# Patient Record
Sex: FEMALE | Race: WHITE | Employment: PART TIME | URBAN - METROPOLITAN AREA
[De-identification: names, ages, dates, MRNs, and addresses within clinical notes are randomized per-mention and may not be internally consistent; named-entity substitution may affect disease eponyms.]

---

## 2017-05-22 ENCOUNTER — OFFICE VISIT (OUTPATIENT)
Dept: ORTHOPEDIC SURGERY | Age: 62
End: 2017-05-22
Payer: COMMERCIAL

## 2017-05-22 VITALS
HEART RATE: 79 BPM | HEIGHT: 67 IN | WEIGHT: 187 LBS | BODY MASS INDEX: 29.35 KG/M2 | SYSTOLIC BLOOD PRESSURE: 137 MMHG | DIASTOLIC BLOOD PRESSURE: 65 MMHG

## 2017-05-22 DIAGNOSIS — G56.01 CARPAL TUNNEL SYNDROME, RIGHT: Primary | ICD-10-CM

## 2017-05-22 PROCEDURE — 99213 OFFICE O/P EST LOW 20 MIN: CPT | Performed by: ORTHOPAEDIC SURGERY

## 2017-06-09 ENCOUNTER — HOSPITAL ENCOUNTER (OUTPATIENT)
Dept: WOMENS IMAGING | Age: 62
Discharge: HOME | End: 2017-06-09
Payer: COMMERCIAL

## 2017-06-09 DIAGNOSIS — Z13.9 SCREENING: ICD-10-CM

## 2017-06-09 PROCEDURE — G0202 SCR MAMMO BI INCL CAD: HCPCS

## 2017-09-13 ENCOUNTER — HOSPITAL ENCOUNTER (OUTPATIENT)
Age: 62
Setting detail: SPECIMEN
Discharge: HOME | End: 2017-09-13
Payer: COMMERCIAL

## 2017-09-14 ENCOUNTER — HOSPITAL ENCOUNTER (OUTPATIENT)
Age: 62
Setting detail: SPECIMEN
Discharge: HOME | End: 2017-09-14
Payer: COMMERCIAL

## 2017-09-14 DIAGNOSIS — Z01.419 VISIT FOR GYNECOLOGIC EXAMINATION: ICD-10-CM

## 2017-09-14 DIAGNOSIS — Z11.59 ENCOUNTER FOR HEPATITIS C SCREENING TEST FOR LOW RISK PATIENT: ICD-10-CM

## 2017-09-14 DIAGNOSIS — Z13.220 SCREENING CHOLESTEROL LEVEL: ICD-10-CM

## 2017-09-14 LAB
ALBUMIN SERPL-MCNC: 4.4 G/DL (ref 3.5–5.2)
ALBUMIN/GLOBULIN RATIO: 1.9 (ref 1–2.5)
ALP BLD-CCNC: 63 U/L (ref 35–104)
ALT SERPL-CCNC: 17 U/L (ref 5–33)
ANION GAP SERPL CALCULATED.3IONS-SCNC: 12 MMOL/L (ref 9–17)
AST SERPL-CCNC: 20 U/L
BILIRUB SERPL-MCNC: 0.34 MG/DL (ref 0.3–1.2)
BUN BLDV-MCNC: 17 MG/DL (ref 8–23)
BUN/CREAT BLD: ABNORMAL (ref 9–20)
CALCIUM SERPL-MCNC: 9.3 MG/DL (ref 8.6–10.4)
CHLORIDE BLD-SCNC: 103 MMOL/L (ref 98–107)
CHOLESTEROL/HDL RATIO: 2.2
CHOLESTEROL: 146 MG/DL
CO2: 26 MMOL/L (ref 20–31)
CREAT SERPL-MCNC: 0.47 MG/DL (ref 0.5–0.9)
GFR AFRICAN AMERICAN: >60 ML/MIN
GFR NON-AFRICAN AMERICAN: >60 ML/MIN
GFR SERPL CREATININE-BSD FRML MDRD: ABNORMAL ML/MIN/{1.73_M2}
GFR SERPL CREATININE-BSD FRML MDRD: ABNORMAL ML/MIN/{1.73_M2}
GLUCOSE BLD-MCNC: 95 MG/DL (ref 70–99)
HCT VFR BLD CALC: 35.6 % (ref 36–46)
HDLC SERPL-MCNC: 65 MG/DL
HEMOGLOBIN: 12 G/DL (ref 12–16)
HEPATITIS C ANTIBODY: NONREACTIVE
LDL CHOLESTEROL: 74 MG/DL (ref 0–130)
MCH RBC QN AUTO: 28.2 PG (ref 26–34)
MCHC RBC AUTO-ENTMCNC: 33.7 G/DL (ref 31–37)
MCV RBC AUTO: 83.9 FL (ref 80–100)
PDW BLD-RTO: 14.3 % (ref 12.5–15.4)
PLATELET # BLD: 180 K/UL (ref 140–450)
PMV BLD AUTO: 7.7 FL (ref 6–12)
POTASSIUM SERPL-SCNC: 4.4 MMOL/L (ref 3.7–5.3)
RBC # BLD: 4.24 M/UL (ref 4–5.2)
SODIUM BLD-SCNC: 141 MMOL/L (ref 135–144)
TOTAL PROTEIN: 6.7 G/DL (ref 6.4–8.3)
TRIGL SERPL-MCNC: 37 MG/DL
VLDLC SERPL CALC-MCNC: NORMAL MG/DL (ref 1–30)
WBC # BLD: 3.3 K/UL (ref 3.5–11)

## 2017-09-15 PROBLEM — D72.819 LEUKOPENIA: Status: ACTIVE | Noted: 2017-09-15

## 2017-09-15 LAB
HPV SAMPLE: NORMAL
HPV SOURCE: NORMAL
HPV, GENOTYPE 16: NOT DETECTED
HPV, GENOTYPE 18: NOT DETECTED
HPV, HIGH RISK OTHER: NOT DETECTED
HPV, INTERPRETATION: NORMAL

## 2017-09-19 LAB — CYTOLOGY REPORT: NORMAL

## 2017-12-07 PROBLEM — M53.3 TAIL BONE PAIN: Status: ACTIVE | Noted: 2017-12-07

## 2017-12-07 PROBLEM — H60.8X2 CHRONIC ECZEMATOUS OTITIS EXTERNA OF LEFT EAR: Status: ACTIVE | Noted: 2017-12-07

## 2017-12-07 PROBLEM — G89.29 CHRONIC MIDLINE LOW BACK PAIN WITHOUT SCIATICA: Status: ACTIVE | Noted: 2017-12-07

## 2017-12-07 PROBLEM — M54.50 CHRONIC MIDLINE LOW BACK PAIN WITHOUT SCIATICA: Status: ACTIVE | Noted: 2017-12-07

## 2018-07-13 ENCOUNTER — HOSPITAL ENCOUNTER (OUTPATIENT)
Dept: WOMENS IMAGING | Age: 63
Discharge: HOME | End: 2018-07-15
Payer: COMMERCIAL

## 2018-07-13 DIAGNOSIS — Z12.31 VISIT FOR SCREENING MAMMOGRAM: ICD-10-CM

## 2018-07-13 PROCEDURE — 77063 BREAST TOMOSYNTHESIS BI: CPT

## 2018-07-20 ENCOUNTER — OFFICE VISIT (OUTPATIENT)
Dept: FAMILY MEDICINE CLINIC | Age: 63
End: 2018-07-20
Payer: COMMERCIAL

## 2018-07-20 VITALS
HEART RATE: 68 BPM | WEIGHT: 176 LBS | BODY MASS INDEX: 27.98 KG/M2 | SYSTOLIC BLOOD PRESSURE: 124 MMHG | TEMPERATURE: 97.7 F | RESPIRATION RATE: 16 BRPM | DIASTOLIC BLOOD PRESSURE: 76 MMHG | OXYGEN SATURATION: 98 %

## 2018-07-20 DIAGNOSIS — S63.602A SPRAIN OF LEFT THUMB, UNSPECIFIED SITE OF FINGER, INITIAL ENCOUNTER: ICD-10-CM

## 2018-07-20 DIAGNOSIS — H81.10 BENIGN PAROXYSMAL POSITIONAL VERTIGO, UNSPECIFIED LATERALITY: ICD-10-CM

## 2018-07-20 DIAGNOSIS — R42 DIZZINESS: Primary | ICD-10-CM

## 2018-07-20 DIAGNOSIS — Z13.220 SCREENING CHOLESTEROL LEVEL: ICD-10-CM

## 2018-07-20 DIAGNOSIS — G57.01 PIRIFORMIS SYNDROME, RIGHT: ICD-10-CM

## 2018-07-20 PROBLEM — M53.3 TAIL BONE PAIN: Status: RESOLVED | Noted: 2017-12-07 | Resolved: 2018-07-20

## 2018-07-20 PROCEDURE — 99214 OFFICE O/P EST MOD 30 MIN: CPT | Performed by: FAMILY MEDICINE

## 2018-07-20 ASSESSMENT — ENCOUNTER SYMPTOMS
RHINORRHEA: 0
NAUSEA: 0
BACK PAIN: 0
COUGH: 0
VOMITING: 0
SORE THROAT: 0
CHEST TIGHTNESS: 0
CONSTIPATION: 0
ABDOMINAL DISTENTION: 0
ABDOMINAL PAIN: 0
DIARRHEA: 0
SHORTNESS OF BREATH: 0

## 2018-07-20 NOTE — PROGRESS NOTES
Visit Information    Have you changed or started any medications since your last visit including any over-the-counter medicines, vitamins, or herbal medicines? no   Have you stopped taking any of your medications? Is so, why? -  no  Are you having any side effects from any of your medications? - no    Have you seen any other physician or provider since your last visit?  no   Have you had any other diagnostic tests since your last visit? yes - wrist xray   Have you been seen in the emergency room and/or had an admission in a hospital since we last saw you?  yes - May for wrist injury   Have you had your routine dental cleaning in the past 6 months?  yes - 07/2018     Do you have an active MyChart account? If no, what is the barrier? Yes    Patient Care Team:  Lear Angelucci, MD as PCP - General (Family Medicine)  Caro Adame MD as Consulting Physician (Dermatology)  Milagros Pinto MD as Consulting Physician (Urology)  Berto Ugalde as Consulting Physician (Colon and Rectal Surgery)  Lorenzo Trotter MD as Consulting Physician (Orthopedic Surgery)  Vinayak Suero MD as Consulting Physician (Dermatology)    Medical History Review  Past Medical, Family, and Social History reviewed and does not contribute to the patient presenting condition    Health Maintenance   Topic Date Due    Flu vaccine (1) 09/13/2018 (Originally 9/1/2018)    Shingles Vaccine (1 of 2 - 2 Dose Series) 07/20/2019 (Originally 6/14/2007)    HIV screen  07/19/2021 (Originally 6/14/1970)    DTaP/Tdap/Td vaccine (2 - Td) 11/24/2019    Breast cancer screen  07/13/2020    Cervical cancer screen  09/13/2022    Lipid screen  09/14/2022    Colon cancer screen colonoscopy  11/16/2025    Hepatitis C screen  Completed       Patient/Caregiver verbalize understanding of medications.   Yes

## 2018-07-20 NOTE — PROGRESS NOTES
Subjective:      Patient ID: Tom Francois is a 66 y.o. female. HPI  Pt here today for an acute visit secondary to dizziness/vertigo with head position for about 3-4 weeks. Pt has been remodeling a rental home and doing a lot of painting and projects and looking up and down. She states the dizziness worse with head position and turning her head and when she lays down. She does get a little nauseated at times. She is also c/o left thumb weakness. She did take a fall and went to the Urgent Care and had an x-ray and was negative for a fracture and was put in a brace. She is also c/o right buttock pain for about 6 months. She describes a deep ache in her buttock. No radiation of the pain in to her legs. No numbness or tingling. Pt today denies any HA, chest pain, or SOB. Pt denies any N/V/D/C or abdominal pain. Pt denies any fever or chills. Otherwise pt doing well on current tx and no other concerns today. The patient's past medical, surgical, social, and family history as well as his current medications and allergies were reviewed as documented in today's encounter. No current outpatient prescriptions on file. No current facility-administered medications for this visit. Review of Systems   Constitutional: Negative for appetite change, fatigue and fever. HENT: Negative for congestion, ear pain, rhinorrhea and sore throat. Respiratory: Negative for cough, chest tightness and shortness of breath. Cardiovascular: Negative for chest pain and palpitations. Gastrointestinal: Negative for abdominal distention, abdominal pain, constipation, diarrhea, nausea and vomiting. Genitourinary: Negative for difficulty urinating and dysuria. Musculoskeletal: Negative for arthralgias, back pain and myalgias. Right buttock pain   Skin: Negative for rash. Neurological: Positive for dizziness (and vertigo with head position), weakness (left thumb) and light-headedness.  Negative for - [facility]   5. Screening cholesterol level  Lipid Panel         Plan:      Orders Placed This Encounter   Procedures    CBC     Standing Status:   Future     Standing Expiration Date:   7/20/2019    Comprehensive Metabolic Panel     Standing Status:   Future     Standing Expiration Date:   7/20/2019    T4, Free     Standing Status:   Future     Standing Expiration Date:   7/20/2019    TSH without Reflex     Standing Status:   Future     Standing Expiration Date:   7/20/2019    Vitamin B12     Standing Status:   Future     Standing Expiration Date:   7/20/2019    Vitamin D 25 Hydroxy     Standing Status:   Future     Standing Expiration Date:   7/20/2019    Lipid Panel     Standing Status:   Future     Standing Expiration Date:   7/20/2019     Order Specific Question:   Is Patient Fasting?/# of Hours     Answer:   8-10 Hours   Shai Odonnell 81.     Referral Priority:   Routine     Referral Type:   Eval and Treat     Referral Reason:   Specialty Services Required     Requested Specialty:   Physical Therapy     Number of Visits Requested:   Bobby 32     Referral Priority:   Routine     Referral Type:   Eval and Treat     Referral Reason:   Specialty Services Required     Requested Specialty:   Occupational Therapy     Number of Visits Requested:   1      I do think her dizziness/vertigo is secondary to BPV - I did give her exercises and will get her in to PT    Will start the PT for the right piriformis as well and also gave her exercises    Will start OT for the left thumb weakness    Will get above labs and follow up on results    Rest of systems unchanged, continue current treatments. Medications, labs, diagnostic studies, consultations and follow-up as documented in this encounter.  Rest of systems unchanged, continue current treatments

## 2018-07-20 NOTE — PATIENT INSTRUCTIONS
Patient Education        Epley Maneuver at Home for Vertigo: Exercises  Your Care Instructions  Vertigo is a spinning or whirling sensation when you move your head. Your doctor may have moved you in different positions to help your vertigo get better faster. This is called the Epley maneuver. Your doctor also may have asked you to do these exercises at home. Do the exercises as often as your doctor recommends. If your vertigo is getting worse, your doctor may have you change the exercise or stop it. Step 1  Step 1    1. Sit on the edge of a bed or sofa. Step 2    1. Turn your head 45 degrees in the direction your doctor told you to. This should be toward the ear that causes the most vertigo for you. In this picture, the woman is turning toward her left ear. Step 3    1. Tilt yourself backward until you are lying on your back. Your head should still be at a 45-degree turn. Your head should be about midway between looking straight ahead and looking out to your side. Hold for 30 seconds. If you have vertigo, stay in this position until it stops. Step 4    1. Turn your head 90 degrees toward the ear that has the least vertigo. In this picture, the woman is turning to the right because she has vertigo on her left side. The point of your chin should be raised and over your shoulder. Hold for 30 seconds. Step 5    1. Roll onto the side with the least vertigo. You should now be looking at the floor. Hold for 30 seconds. Follow-up care is a key part of your treatment and safety. Be sure to make and go to all appointments, and call your doctor if you are having problems. It's also a good idea to know your test results and keep a list of the medicines you take. Where can you learn more? Go to https://chpereynaldoeb.Advise Only. org and sign in to your Elevaate account. Enter A679 in the I Read Books box to learn more about \"Epley Maneuver at Home for Vertigo: Exercises. \"     If you do not have an account, please click on the \"Sign Up Now\" link. Current as of: October 9, 2017  Content Version: 11.6  © 1994-0889 [employer]. Care instructions adapted under license by [facility] ([facility]). If you have questions about a medical condition or this instruction, always ask your healthcare professional. [address] any warranty or liability for your use of this information. Patient Education        Epley Maneuver for Vertigo: Exercises  Your Care Instructions  The Epley Maneuver is a series of movements your doctor may use to treat your vertigo. Here are the steps for the exercises. Your doctor or physical therapist will guide you through the movements. A single 10- to 15-minute session often is all that's needed. Crystal debris (canaliths) cause the vertigo. When your head is moved into different positions, the debris moves freely. This may cause your symptoms to stop. How to do the exercises  Step 1    2. You will sit on the doctor's exam table. Your legs will be out in front of you. The doctor or physical therapist will turn your head so that it is senior care between looking straight ahead and looking to the side that causes the worst vertigo. 3. Without changing your head position, he or she will guide you back quickly. Your shoulders will be on the table. Your head will hang over the edge of the table. At this point, the side of your head that is causing the worst vertigo will face the floor. You'll stay in this position for 30 seconds or until your symptoms stop. Step 2    2. Then, the doctor or physical therapist will turn your head to the other side. You don't need to lift your head. The other side of your head will face the floor. You will stay in this position for 30 seconds or until your symptoms stop. Step 3    2. The doctor or physical therapist will help you roll your body in the same direction that your head is facing. You will lie on your side.  (For example, if speaking. ¨ Sudden confusion or trouble understanding simple statements. ¨ Sudden problems with walking or balance. ¨ A sudden, severe headache that is different from past headaches.    Call your doctor now or seek immediate medical care if:    · You feel dizzy and have a fever, headache, or ringing in your ears.     · You have new or increased nausea and vomiting.     · Your dizziness does not go away or comes back.    Watch closely for changes in your health, and be sure to contact your doctor if:    · You do not get better as expected. Where can you learn more? Go to https://Paid To Party LLCpeHurix Systems Privateeweb.Palmaz Scientific. org and sign in to your Kumu Networks account. Enter R039 in the MyStarAutograph box to learn more about \"Dizziness: Care Instructions. \"     If you do not have an account, please click on the \"Sign Up Now\" link. Current as of: November 20, 2017  Content Version: 11.6  © 5636-9169 Versa Networks. Care instructions adapted under license by [facility] ([facility]). If you have questions about a medical condition or this instruction, always ask your healthcare professional. [place] [address] any warranty or liability for your use of this information. Patient Education        Piriformis Syndrome: Care Instructions  Your Care Instructions    The piriformis muscle is deep under your rear end (buttock). One end of the muscle connects deep inside the pelvic area, and the other end attaches to the top of the thighbone. This muscle can press on the sciatic nerve that runs from your spine down your leg. When this happens, you may have pain, numbness, and tingling in the buttock and down the back of your leg. This is called piriformis syndrome. The pain may get worse when you sit for a long time or climb stairs. Also, you may be more likely to develop piriformis syndrome if you run or walk often. Your doctor will check for other causes of your pain before treating this syndrome.  Treatment may medicines. Read and follow all instructions on the label. · Have your doctor or a physical therapist watch how you move. You may need physical therapy or special inserts in your shoes (orthotics) to help you move in a way that does not put pressure on your nerves. When should you call for help? Watch closely for changes in your health, and be sure to contact your doctor if:    · You do not feel better after several weeks of home care.     · Your pain gets worse.     · Your leg becomes weak or numb. Where can you learn more? Go to https://SplashMapspeBathurst Resources Limited.Zumper. org and sign in to your LOYAL3 account. Enter X267 in the Woto box to learn more about \"Piriformis Syndrome: Care Instructions. \"     If you do not have an account, please click on the \"Sign Up Now\" link. Current as of: November 29, 2017  Content Version: 11.6  © 9970-8008 Pronota. Care instructions adapted under license by [facility] ([facility]). If you have questions about a medical condition or this instruction, always ask your healthcare professional. [address] any warranty or liability for your use of this information. Patient Education        Piriformis Syndrome: Exercises  Your Care Instructions  Here are some examples of typical rehabilitation exercises for your condition. Start each exercise slowly. Ease off the exercise if you start to have pain. Your doctor or physical therapist will tell you when you can start these exercises and which ones will work best for you. How to do the exercises  Hip rotator stretch    4. Lie on your back with both knees bent and your feet flat on the floor. 5. Put the ankle of your affected leg on your opposite thigh near your knee. 6. Use your hand to gently push your knee (on your affected leg) away from your body until you feel a gentle stretch around your hip. 7. Hold the stretch for 15 to 30 seconds. 8. Repeat 2 to 4 times.   9. Switch legs Exercises  Your Care Instructions  Here are some examples of typical rehabilitation exercises for your condition. Start each exercise slowly. Ease off the exercise if you start to have pain. Your doctor or physical therapist will tell you when you can start these exercises and which ones will work best for you. How to do the exercises  Exercise 1    10. Stand with a chair in front of you and a wall behind you. If you begin to fall, you may use them for support. 11. Stand with your feet together and your arms at your sides. 12. Move your head up and down 10 times. Exercise 2    8. Move your head side to side 10 times. Exercise 3    10. Move your head diagonally up and down 10 times. Exercise 4    3. Move your head diagonally up and down 10 times on the other side. Follow-up care is a key part of your treatment and safety. Be sure to make and go to all appointments, and call your doctor if you are having problems. It's also a good idea to know your test results and keep a list of the medicines you take. Where can you learn more? Go to https://The Business of FashionpeAssayMetrics.ClearRisk. org and sign in to your TV Talk Network account. Enter F349 in the ePartners box to learn more about \"Vertigo: Exercises. \"     If you do not have an account, please click on the \"Sign Up Now\" link. Current as of: May 4, 2017  Content Version: 11.6  © 7982-8296 TreatFeed, Incorporated. Care instructions adapted under license by [facility] ([facility]). If you have questions about a medical condition or this instruction, always ask your healthcare professional. [place] [address] any warranty or liability for your use of this information. Patient Education        Cawthorne Exercises for Vertigo: Care Instructions  Your Care Instructions  Simple exercises can help you regain your balance when you have vertigo.  If you have Ménière's disease, benign paroxysmal positional vertigo (BPPV), or another inner ear problem, you may cleared of obstacles:  · Walk to a corner of the room, turn to your right, and walk back to the starting point. Now, repeat and turn left. · Walk up and down a slope. Now try stairs. · While holding on to someone's arm, try these exercises with your eyes closed. When should you call for help? Watch closely for changes in your health, and be sure to contact your doctor if:    · You do not get better as expected. Where can you learn more? Go to https://KIS Grouppereynaldoeb.U.S. Local News Network. org and sign in to your FitWithMe account. Enter P385 in the Snacksquare box to learn more about \"Cawthorne Exercises for Vertigo: Care Instructions. \"     If you do not have an account, please click on the \"Sign Up Now\" link. Current as of: May 12, 2017  Content Version: 11.6  © 6637-6048 TravelZeeky. Care instructions adapted under license by [facility] ([facility]). If you have questions about a medical condition or this instruction, always ask your healthcare professional. [place] [address] any warranty or liability for your use of this information. Patient Education        Benign Paroxysmal Positional Vertigo (BPPV): Care Instructions  Your Care Instructions    Benign paroxysmal positional vertigo, also called BPPV, is an inner ear problem. It causes a spinning or whirling sensation when you move your head. This sensation is called vertigo. The vertigo usually lasts for less than a minute. People often have vertigo spells for a few days or weeks. Then the vertigo goes away. But it may come back again. The vertigo may be mild, or it may be bad enough to cause unsteadiness, nausea, and vomiting. When you move, your inner ear sends messages to the brain. This helps you keep your balance. Vertigo can happen when debris builds up in the inner ear. The buildup can cause the inner ear to send the wrong message to the brain.   Your doctor may move you in different positions to help your vertigo get

## 2018-07-25 DIAGNOSIS — Z13.220 SCREENING CHOLESTEROL LEVEL: ICD-10-CM

## 2018-07-25 DIAGNOSIS — R42 DIZZINESS: ICD-10-CM

## 2018-07-25 LAB
ALBUMIN SERPL-MCNC: 4.2 G/DL
ALP BLD-CCNC: 59 U/L
ALT SERPL-CCNC: 15 U/L
ANION GAP SERPL CALCULATED.3IONS-SCNC: NORMAL MMOL/L
AST SERPL-CCNC: 18 U/L
BASOPHILS ABSOLUTE: NORMAL /ΜL
BASOPHILS RELATIVE PERCENT: NORMAL %
BILIRUB SERPL-MCNC: 0.5 MG/DL (ref 0.1–1.4)
BUN BLDV-MCNC: 15 MG/DL
CALCIUM SERPL-MCNC: 9.2 MG/DL
CHLORIDE BLD-SCNC: 103 MMOL/L
CHOLESTEROL, TOTAL: 161 MG/DL
CHOLESTEROL/HDL RATIO: 2.6
CO2: NORMAL MMOL/L
CREAT SERPL-MCNC: 0.73 MG/DL
EOSINOPHILS ABSOLUTE: NORMAL /ΜL
EOSINOPHILS RELATIVE PERCENT: NORMAL %
GFR CALCULATED: NORMAL
GLUCOSE BLD-MCNC: 101 MG/DL
HCT VFR BLD CALC: 36.3 % (ref 36–46)
HDLC SERPL-MCNC: 62 MG/DL (ref 35–70)
HEMOGLOBIN: 12.5 G/DL (ref 12–16)
LDL CHOLESTEROL CALCULATED: 88 MG/DL (ref 0–160)
LYMPHOCYTES ABSOLUTE: NORMAL /ΜL
LYMPHOCYTES RELATIVE PERCENT: NORMAL %
MCH RBC QN AUTO: NORMAL PG
MCHC RBC AUTO-ENTMCNC: NORMAL G/DL
MCV RBC AUTO: NORMAL FL
MONOCYTES ABSOLUTE: NORMAL /ΜL
MONOCYTES RELATIVE PERCENT: NORMAL %
NEUTROPHILS ABSOLUTE: NORMAL /ΜL
NEUTROPHILS RELATIVE PERCENT: NORMAL %
PLATELET # BLD: 157 K/ΜL
PMV BLD AUTO: NORMAL FL
POTASSIUM SERPL-SCNC: 3.7 MMOL/L
RBC # BLD: NORMAL 10^6/ΜL
SODIUM BLD-SCNC: 139 MMOL/L
T4 FREE: 0.85
TOTAL PROTEIN: 6.5
TRIGL SERPL-MCNC: 53 MG/DL
TSH SERPL DL<=0.05 MIU/L-ACNC: 2.05 UIU/ML
VITAMIN B-12: 252
VITAMIN D 25-HYDROXY: 37.6
VITAMIN D2, 25 HYDROXY: NORMAL
VITAMIN D3,25 HYDROXY: NORMAL
VLDLC SERPL CALC-MCNC: NORMAL MG/DL
WBC # BLD: 3 10^3/ML

## 2018-07-26 ENCOUNTER — HOSPITAL ENCOUNTER (OUTPATIENT)
Dept: OCCUPATIONAL THERAPY | Age: 63
Setting detail: THERAPIES SERIES
Discharge: HOME | End: 2018-07-26
Payer: COMMERCIAL

## 2018-07-26 PROCEDURE — 97110 THERAPEUTIC EXERCISES: CPT

## 2018-07-26 PROCEDURE — 97165 OT EVAL LOW COMPLEX 30 MIN: CPT

## 2018-07-26 ASSESSMENT — 9 HOLE PEG TEST
TESTTIME_SECONDS: 28
TEST_RESULT: FUNCTIONAL
TESTTIME_SECONDS: 20
TEST_RESULT: IMPAIRED

## 2018-07-26 NOTE — PROGRESS NOTES
percentile for norms)  Tip: 7,8 average 7.5# (25th percentile for norms)  Palmar 3 point: 11,11 (between 50th & 70th percentile for norms)  Right Hand Strength -  (lbs)  Handle Setting 2: 47,50 average 48.5# (between the 25th and 50th percentile for norms)  Right Hand Strength - Pinch (lbs)  Lateral: 21,20  Tip: 14,14  Palmar 3 point: 15,16  Fine Motor Skills  Left 9-Hole Peg Test: Impaired (Pt is between 10th and 25th percentile for norms)  Left 9 Hole Peg Test Time (secs): 28  Right 9-Hole Peg Test: Functional  Right 9 Hole Peg Test Time (secs): 20  Other exercises  Other exercises?: Yes  Other exercises 1: HEP lt thumb IP and MP blocking exercises, handouts w lt hand 6 PACK AROM exercises, towel crunch for flexion, & lt wrist Arom flexion/extension, lt thumb (adduction, reach to base small finger, palmar & radial abduction, thumb circles cw/ccw, opposition to index & little fingers. Other exercises 2: Massage and PROM lt wrist/thumb  Assessment  Performance deficits / Impairments: Decreased ROM, Decreased fine motor control, Decreased strength  Assessment: Pt would benefit from therapy to improve functional rom, coordination, strength lt hand/thumb/wrist to make fine motor tasks easier. Treatment Diagnosis: Lt hand/wrist weakness, decrease motion/stiffness lt thumb/wrist, impaired lt hand coordination  Prognosis: Good  Decision Making: Low Complexity  History:  Pt injured lt thumb after a fall in the yard. Arthritis. Pt states difficulty making fist/closing her hands in the a.m. Exam: 3 performance deficits. Assessed ROM, strength, coordination - 9 hole peg test, dynamometer for , pinch meter for pinch strength. Assistance / Modification: Pt can't fully extend lt thumb & has rom limitations for flexion, so pt uses rt had to position lt thumb. Pt unable to lift lt thumb off table when palm is resting on table. Patient Education: OT POC. OT instructed pt in HEP for lt thumb/ hand/wrist w handouts. See OT exercises for details. Pt correctly demonstratesHEP  exercises. Copy of handouts in chart. OT told pt to use her thumb spica brace at night to for rest and positioning lt thumb. Barriers to Learning: none  REQUIRES OT FOLLOW UP: Yes  Treatment Initiated : PROM/massage lt thumb Paddy Gravely. HEP instruction lt wrist/hand/thumb. Discharge Recommendations: Outpatient OT   Goals  Patient Goals   Patient goals : To get thumb back, get movement and strength back. Short term goals  Time Frame for Short term goals: 4 weeks   Short term goal 1: Pt will demonstrate and verbalize independence w HEP for lt thumb,hand, wrist   Short term goal 2: Pt will demonstrate improved lt hand fine motor coordination w computer typing, hooking bra, & complete 9 hole peg test using lt hand 5 seconds faster than eval.  Short term goal 3: Increase lt hand  strength by 10th to improve grasp w daily activities  Short term goal 4: Increase lt hand tip pinch strength by 2#   Short term goal 5: increase  AROM lt wrist (flexion,extension, UD) by 10. Long term goals  Time Frame for Long term goals : 6 weeks  Long term goal 1: Increase lt wrist  and hand flexor strength to 4/5 and increase lt thumb flexor & extensor strength to 4/5 to improve grasp/release strength for daily activities  Long term goal 2: Increase AROM lt thumb: IP  flexion & extension by 10, MP flexion by 10 & extension by 15. Long term goal 3:  Increase lt thumb: palmar abduction by 15 and radial abduction by 5  Long term goal 4: Pt will be able to  lift lt thumb off table (when palm is resting on table) and hold for 10 seconds  Plan  REQUIRES OT FOLLOW UP: Yes  Plan  Times per week: 2  Plan weeks: 5-6 weeks  Current Treatment Recommendations: ROM, Strengthening, Patient/Caregiver Education & Training (coordination)  Plan Comment: Send OT eval. to MD. Continue OT  OT Individual Minutes  Time In: 5405  Time Out: 1510  Minutes: 50  Time Code Minutes   Timed Code Treatment

## 2018-07-30 ENCOUNTER — HOSPITAL ENCOUNTER (OUTPATIENT)
Dept: OCCUPATIONAL THERAPY | Age: 63
Setting detail: THERAPIES SERIES
Discharge: HOME | End: 2018-07-30
Payer: COMMERCIAL

## 2018-07-30 PROCEDURE — 97110 THERAPEUTIC EXERCISES: CPT

## 2018-07-30 NOTE — PROGRESS NOTES
stretch in lt hand/wrist w theraband flex bar and key peg exxercises. Pt beginning to extend lt thumb IP joint more, but still unable to raise lt thumb off table w palm flat. Treatment Diagnosis: Lt hand/wrist weakness, decrease motion/stiffness lt thumb/wrist, impaired lt hand coordination  Prognosis: Good  Patient Education: HEP -hands in prayer position then adduct/abduct thumbs 20x. Lt thumb hold edge of table 10 seconds 2 sets 10x w wrist in neutral position & fingers under table flexed. Pt hold marker then flex/extend thumb IP.   Pt correctly demonstrate these exercises  Barriers to Learning: None   REQUIRES OT FOLLOW UP: Yes  Discharge Recommendations: Outpatient OT           Plan  REQUIRES OT FOLLOW UP: Yes  Plan  Times per week: 2  Plan weeks: 5-6 weeks  Current Treatment Recommendations: ROM, Strengthening, Patient/Caregiver Education & Training (coordination)  Plan Comment:  Continue OT  OT Individual Minutes  Time In: 3233  Time Out: 9843  Minutes: 52  Time Code Minutes   Timed Code Treatment Minutes: 46 Minutes    Electronically signed by Miriam Kim OT on 7/30/18 at 7:37 PM          Treatment Charges:  Minutes Units   Ultrasound 5 0   Electrical Stim     Iontophoresis     Paraffin      Massage     Eval     ADL      Ther Exercise 47 3   Ther Activities     Neuro Re-Ed     Splinting      Other     Total Treatment Time:  52

## 2018-08-01 ENCOUNTER — HOSPITAL ENCOUNTER (OUTPATIENT)
Dept: OCCUPATIONAL THERAPY | Age: 63
Setting detail: THERAPIES SERIES
Discharge: HOME | End: 2018-08-01
Payer: COMMERCIAL

## 2018-08-01 PROCEDURE — 97110 THERAPEUTIC EXERCISES: CPT

## 2018-08-01 NOTE — PROGRESS NOTES
Occupational [address] [facility]   Rehabilitation Services  Occupational Therapy Treatment Note  Date: 18  Patient Name: Niall Carrillo    MRN: [id number]  Account: [de-identified]   : 1955  (66 y.o.) Gender: female     General  Referring Practitioner: Dr Mare Saenz. Usha  Diagnosis: sprain of lt thumb S63.602A ICD-10 CM   OT Visit Information  OT Insurance Information: [employer]  Total # of Visits Approved: 12  Total # of Visits to Date: 3  Progress Note Counter: MD appt 2018  Subjective  Subjective: Pt reports doing exercises lt thumb. Pt states that she has no pain. Pain Assessment  Patient Currently in Pain: No           Other exercises  Other exercises?: Yes  Other exercises 1: HEP -peach theraputty w handout -lt hand strengthening ( gripping, oppositional grimes pinch, finger adductors, lateral pinch, 3jaw mirtha pinch and pull, thumb and finger extensor and abductor strengthening) 15-20 reps   Other exercises 2: Massage and PROM lt wrist/thumb  Other exercises 3: Ultrasound 1.0 w/cm 2 lt  radial side wrist/thumb  Other exercises 4: red/beige pegs (1/2\" diameter cylindrical pegs) - 47 pegs using lt hand flip over & place, then place pegs  Other exercises 5: juxaciser over and back 3 sets w lt hand  Other exercises 6: yellow 1.5# digiflex - lt hand gripping 20x, each finger and thumb pinching 20x  Other exercises 7: key peg place/remove all 25 pegs using lt hand  Other exercises 9: yellow theraband 6# flexbar - bilateral UE 20x (make U, make upside down U, twist)  Other exercises 10: graded clothespins: lt hand place/remove yellow min resistance, red medium resistance, green moderate resistance  Assessment  Performance deficits / Impairments: Decreased ROM, Decreased fine motor control, Decreased strength  Assessment: OT progressed pt graded clothespin exercise to improve lt hand prehension strength. Ultrasound to decrease tightness lt thumb.  Tx focus on lt hand/thumb Kiana Lester  P/AROM

## 2018-08-08 ENCOUNTER — HOSPITAL ENCOUNTER (OUTPATIENT)
Dept: OCCUPATIONAL THERAPY | Age: 63
Setting detail: THERAPIES SERIES
Discharge: HOME | End: 2018-08-08
Payer: COMMERCIAL

## 2018-08-08 PROCEDURE — 97110 THERAPEUTIC EXERCISES: CPT

## 2018-08-08 NOTE — PROGRESS NOTES
exercises 11: BTE tool 601 T = 8 inlb lt supinate/pronate 120 seconds  Other exercises 12: BTE tool 302 T = 8 inlb lt wrist RD/UD 90 seconds  Other exercises 13: BTE tool 162 T = 50 inlb lt hand gripping 90 seconds  Assessment  Performance deficits / Impairments: Decreased ROM, Decreased fine motor control, Decreased strength  Assessment: Pt demonstrates improved lt thumb and wrist AROM. Begun lt hand wrist strengthening on BTE w tools 162 to increase  strength, 302 to simulate open/close jars, and 601 hand grasp w forearm supination /pronation. Pt completes upgraded digiflex for lt hand /finger strengthening w no reports of pain. Pt reports using lt hand more w daily activities (ie opening toothpaste tube, picking up small items etc.)See OT exercises for improving lt hand/wrist strength and rom.    Treatment Diagnosis: Lt hand/wrist weakness, decrease motion/stiffness lt thumb/wrist, impaired lt hand coordination  Prognosis: Good  REQUIRES OT FOLLOW UP: Yes  Discharge Recommendations: Outpatient OT     Plan  REQUIRES OT FOLLOW UP: Yes  Plan  Times per week: 2  Plan weeks: 5-6 weeks  Current Treatment Recommendations: ROM, Strengthening, Patient/Caregiver Education & Training (coordination)  Plan Comment:  Continue OT  OT Individual Minutes  Time In: 1300  Time Out: 5346  Minutes: 45  Time Code Minutes   Timed Code Treatment Minutes: 45 Minutes    Electronically signed by Jackelin Aguilar OT on 8/8/18 at 6:57 PM      Treatment Charges:  Minutes Units   Ultrasound 5 0   Electrical Stim     Iontophoresis     Paraffin      Massage     Eval     ADL      Ther Exercise 40 3   Ther Activities     Neuro Re-Ed     Splinting      Other     Total Treatment Time:  45

## 2018-08-10 ENCOUNTER — HOSPITAL ENCOUNTER (OUTPATIENT)
Dept: OCCUPATIONAL THERAPY | Age: 63
Setting detail: THERAPIES SERIES
Discharge: HOME | End: 2018-08-10
Payer: COMMERCIAL

## 2018-08-10 PROCEDURE — 97110 THERAPEUTIC EXERCISES: CPT

## 2018-08-10 NOTE — PROGRESS NOTES
Occupational [address] [facility]   Rehabilitation Services  Occupational Therapy Treatment Note  Date: 8/10/18  Patient Name: Makenna Agustin    MRN: [id number]  Account: [de-identified]   : 1955  (66 y.o.) Gender: female     General  Referring Practitioner: Dr Melanie Kerr  Diagnosis: sprain of lt thumb S63.602A ICD-10 CM   OT Visit Information  OT Insurance Information: [employer]  Total # of Visits Approved: 12  Total # of Visits to Date: 5  Progress Note Counter: MD appt 2018  Subjective  Subjective: Pt states her daughter was in town. Pt states her lt thumb is doing /moving much better. Pt states lt thumb is more flexible and she is doing all her exercises. Pt states she still can't raise her lt thumb off table w palm flat. Comments: OT called pt and reminded her of appt. Pt states she forgot. Pt came later in afternoon for OT.    Pain Assessment  Patient Currently in Pain: Denies           Other exercises  Other exercises 2: Massage and PROM lt wrist/thumb  Other exercises 3: Ultrasound 1.0 w/cm 2 lt  radial side wrist/thumb  Other exercises 6: red 3# digiflex - lt hand gripping 20x, each finger and thumb pinching 20x  Other exercises 9: yellow theraband 6# flexbar - bilateral UE 20x (make U, make upside down U, twist, make C,make backward C)  Other exercises 10: graded clothespins: lt hand place/remove yellow min resistance, red medium resistance, green moderate resistance, blue heavy resistance  Other exercises 11: BTE tool 601 T = 8 inlb lt supinate/pronate 120 seconds  Other exercises 12: BTE tool 302 T = 8 inlb lt wrist RD/ seconds  Other exercises 13: BTE tool 162 T = 50 inlb lt hand gripping 120 seconds  Other exercises 14:  use lt hand press cone right side up into putty 20x and press cone upside down into putty 20x  Assessment  Performance deficits / Impairments: Decreased ROM, Decreased fine motor control, Decreased strength  Assessment: OT tx focus on ultrasound to decrease lt thumb tightness so that pt can flex/extend lt thumb at MP and IP more. Tx includes P/AROM lt hand /thumb, and lt wrist/hand strengthening exercises. Begun cone in putty exercise for lt hand /wrist strengthening. See OT exercises for details. Treatment Diagnosis: Lt hand/wrist weakness, decrease motion/stiffness lt thumb/wrist, impaired lt hand coordination  Prognosis: Good  Patient Education: HEP lt thumb tapping on table w palm slightly raised off table. Pt able to do this exercise. With palm flat pt able to move lt thumb minimally off table but it is shaky.    Barriers to Learning: None  REQUIRES OT FOLLOW UP: Yes  Discharge Recommendations: Outpatient OT           Plan  REQUIRES OT FOLLOW UP: Yes  Plan  Times per week: 2  Plan weeks: 5-6 weeks  Specific instructions for Next Treatment: Do progress update for MD next week  Current Treatment Recommendations: ROM, Strengthening, Patient/Caregiver Education & Training (coordination)  Plan Comment:  Continue OT  OT Individual Minutes  Time In: 2642  Time Out: 0730  Minutes: 50  Time Code Minutes   Timed Code Treatment Minutes: 50 Minutes    Electronically signed by Olivier Quinonez OT on 8/10/18 at 3:46 PM          Treatment Charges:  Minutes Units   Ultrasound 5 0   Electrical Stim     Iontophoresis     Paraffin      Massage     Eval     ADL      Ther Exercise 45 3   Ther Activities     Neuro Re-Ed     Splinting      Other     Total Treatment Time:  50

## 2018-08-15 ENCOUNTER — HOSPITAL ENCOUNTER (OUTPATIENT)
Dept: OCCUPATIONAL THERAPY | Age: 63
Setting detail: THERAPIES SERIES
Discharge: HOME | End: 2018-08-15
Payer: COMMERCIAL

## 2018-08-15 PROCEDURE — 97110 THERAPEUTIC EXERCISES: CPT

## 2018-08-15 ASSESSMENT — 9 HOLE PEG TEST
TESTTIME_SECONDS: 20
TEST_RESULT: FUNCTIONAL

## 2018-08-15 NOTE — PROGRESS NOTES
Occup[facility]  Re[facility] Services          Occupational Therapy Progress Update Note  Date: 8/15/18  Patient Name: Julio Trejo      MRN: [id number]  Account: [de-identified]   : 1955  (66 y.o.)  Gender: female   Referring Practitioner: Dr Michael Perla. Usha  Diagnosis: sprain of lt thumb S63.602A ICD-10 CM   OT Visit Information  OT Insurance Information: [employer]  Total # of Visits Approved: 12  Total # of Visits to Date: 6  Progress Note Counter: MD appt 2018  Pain Assessment  Patient Currently in Pain: Denies  Subjective  Subjective: Pt states no pain, just some tightness that stretches out on top of lt hand. Pt states able to put lt hand behind back and scratch w thumb. Pt states using button on cell phone w lt thumb is getting better. Objective  UE Function  LUE Strength  L Wrist Flex: 4+/5 (increase)  L Wrist Ext: 4+/5 (increase)  L Wrist Radial Deviation: 4+/5 (increase)  L Wrist Ulnar Deviation: 4+/5 (increase)  L Hand Grasp: 4/5 (increase)  L Hand Release: 4-/5 (decrease)  LUE AROM (degrees)  L Wrist Flexion 0-80: 65 (increase)  L Wrist Extension 0-70: 60 (increase w soreness)  L Wrist Radial Deviation 0-20: 20 (increase )  L Wrist Ulnar Deviation 0-45: 35 (increase)  Left Hand AROM (degrees)  Left Hand General AROM: Lt index, long, ring, little fingers - wfls  L Thumb MCP 0-50: 50  flexion(increase), 15 extension lag  L Thumb IP 0-80: 50 flexion, 10 extension lag  L Thumb Radial ADduction/ABduction 0-55: 45  L Thumb Palmar ADduction/ABduction 0-45: 45  L Thumb Opposition: wfls  Left Hand Strength -  (lbs)  Handle Setting 2: 37,43 average 40# (increase by 5)  Left Hand Strength - Pinch (lbs)  Tip: 8, 8 (increase by . 5)  Fine Motor Skills  Left 9-Hole Peg Test: Functional (improved speed by 8 seconds)  Left 9 Hole Peg Test Time (secs): 20  Other exercises  Other exercises 1: HEP isometric flexion thumbs hold 10 seconds 10x  Other exercises 2: Massage and Pt reports improved typing . Short term goal 3: Increase lt hand  strength by 10# to improve grasp w daily activities. Pt making progress - goal ongoing. Short term goal 4: Increase lt hand tip pinch strength by 2# . Pt making progress - goal ongoing. Short term goal 5: increase  AROM lt wrist (flexion,extension, UD) by 10. Goal met for all lt wrist motions. Long term goals  Long term goal 1: Increase lt wrist  and hand flexor strength to 4/5 and increase lt thumb flexor & extensor strength to 4/5 to improve grasp/release strength for daily activities. Goal met for lt wrist strength. Goal met for lt hand flexors and ongoing for lt hand extensors. Goal met for lt thumb flexors & goal ongoing for lt thumb extensors. Long term goal 2: Increase AROM lt thumb: IP  flexion & extension by 10, MP flexion by 10 & extension by 15. Goal met MP flexion . Pt making progress w MP extension -goal ongoing. Goal ongoing thumb IP AROM . Long term goal 3: Increase lt thumb: palmar abduction by 15 and radial abduction by 5. Goal met palmar abduction. Goal ongoing radial abduction lt thumb. Long term goal 4: Pt will be able to  lift lt thumb off table (when palm is resting on table) and hold for 10 seconds. Pt has made a little progress but goal is ongoing. Plan  REQUIRES OT FOLLOW UP: Yes  Plan  Times per week: 2  Plan weeks: 5-6 weeks  Current Treatment Recommendations: ROM, Strengthening, Patient/Caregiver Education & Training (coordination)  Plan Comment: Send update note to MD. Continue OT.    OT Individual Minutes  Time In: 3259  Time Out: 2461  Minutes: 50  Time Code Minutes   Timed Code Treatment Minutes: 35 Minutes  Rehab Potential:  [x] Good  [] Fair  [] Poor   Suggested Professional Referral:  [x] No  [] Yes:  Barriers to Goal Achievement:  [x] No  [] Yes: Domestic Concerns:  [] No  [] Yes:  Treatment Plan:  [x] Therapeutic Exercise      [x] Ultrasound   [x] Instruction in HEP       Frequency:   Continue  2

## 2018-08-17 ENCOUNTER — OFFICE VISIT (OUTPATIENT)
Dept: FAMILY MEDICINE CLINIC | Age: 63
End: 2018-08-17
Payer: COMMERCIAL

## 2018-08-17 ENCOUNTER — HOSPITAL ENCOUNTER (OUTPATIENT)
Dept: OCCUPATIONAL THERAPY | Age: 63
Setting detail: THERAPIES SERIES
Discharge: HOME | End: 2018-08-17
Payer: COMMERCIAL

## 2018-08-17 ENCOUNTER — HOSPITAL ENCOUNTER (OUTPATIENT)
Dept: GENERAL RADIOLOGY | Facility: CLINIC | Age: 63
Discharge: HOME | End: 2018-08-19
Payer: COMMERCIAL

## 2018-08-17 ENCOUNTER — HOSPITAL ENCOUNTER (OUTPATIENT)
Facility: CLINIC | Age: 63
Discharge: HOME | End: 2018-08-19
Payer: COMMERCIAL

## 2018-08-17 VITALS
DIASTOLIC BLOOD PRESSURE: 80 MMHG | TEMPERATURE: 97.8 F | WEIGHT: 180 LBS | SYSTOLIC BLOOD PRESSURE: 146 MMHG | RESPIRATION RATE: 16 BRPM | HEART RATE: 64 BPM | OXYGEN SATURATION: 98 % | BODY MASS INDEX: 28.62 KG/M2

## 2018-08-17 DIAGNOSIS — S63.602D SPRAIN OF LEFT THUMB, UNSPECIFIED SITE OF FINGER, SUBSEQUENT ENCOUNTER: ICD-10-CM

## 2018-08-17 DIAGNOSIS — H81.10 BENIGN PAROXYSMAL POSITIONAL VERTIGO, UNSPECIFIED LATERALITY: Primary | ICD-10-CM

## 2018-08-17 DIAGNOSIS — D49.2 LEFT FOOT SOFT TISSUE TUMOR: ICD-10-CM

## 2018-08-17 DIAGNOSIS — R73.9 HYPERGLYCEMIA: ICD-10-CM

## 2018-08-17 DIAGNOSIS — E53.8 B12 DEFICIENCY: ICD-10-CM

## 2018-08-17 DIAGNOSIS — G57.01 PIRIFORMIS SYNDROME, RIGHT: ICD-10-CM

## 2018-08-17 DIAGNOSIS — D72.819 LEUKOPENIA, UNSPECIFIED TYPE: ICD-10-CM

## 2018-08-17 LAB — HBA1C MFR BLD: 5.6 %

## 2018-08-17 PROCEDURE — 73630 X-RAY EXAM OF FOOT: CPT

## 2018-08-17 PROCEDURE — 83036 HEMOGLOBIN GLYCOSYLATED A1C: CPT | Performed by: FAMILY MEDICINE

## 2018-08-17 PROCEDURE — 97110 THERAPEUTIC EXERCISES: CPT

## 2018-08-17 PROCEDURE — 73130 X-RAY EXAM OF HAND: CPT

## 2018-08-17 PROCEDURE — 99214 OFFICE O/P EST MOD 30 MIN: CPT | Performed by: FAMILY MEDICINE

## 2018-08-17 ASSESSMENT — ENCOUNTER SYMPTOMS
DIARRHEA: 0
CHEST TIGHTNESS: 0
NAUSEA: 0
SHORTNESS OF BREATH: 0
SORE THROAT: 0
COUGH: 0
RHINORRHEA: 0
CONSTIPATION: 0
VOMITING: 0
BACK PAIN: 0
ABDOMINAL PAIN: 0
ABDOMINAL DISTENTION: 0

## 2018-08-17 NOTE — PROGRESS NOTES
Occupational [address] [facility]   Rehabilitation Services  Occupational Therapy Treatment Note  Date: 18  Patient Name: Alexis Olivo    MRN: [id number]  Account: [de-identified]   : 1955  (66 y.o.) Gender: female     General  Referring Practitioner: Dr Billie Crowe. Usha  Diagnosis: sprain of lt thumb S63.602A ICD-10 CM   OT Visit Information  OT Insurance Information: [employer]  Total # of Visits Approved: 12  Total # of Visits to Date: 7  Subjective  Subjective: Pt saw MD today. Pt states she will get xray on her lt hand after therapy. Pt states MD wants her to see Dr Yue Griffith. Lanie Hylton for her hand  Pain Assessment  Patient Currently in Pain: Denies           Other exercises  Other exercises 2: Massage and PROM lt wrist/thumb  Other exercises 3: Ultrasound 1.0 w/cm 2 lt  radial side wrist/thumb  Other exercises 5: juxaciser over and back 3 sets w lt hand  Other exercises 6: red 3# digiflex - lt hand gripping 25x, each finger and thumb pinching 25x  Other exercises 7: key peg place/remove all 25 pegs using lt hand  Other exercises 9: red theraband 10# flexbar - bilateral UE 20x (make U, make upside down U, twist, make C,make backward C)  Other exercises 10: graded clothespins: lt hand place/remove yellow min resistance, red medium resistance, green moderate resistance, blue heavy resistance, black very heavy resist.   Other exercises 11: BTE tool 601 T = 10 inlb lt supinate/pronate 120 seconds  Other exercises 12: BTE tool 302 T = 8 inlb lt wrist RD/ seconds  Other exercises 13: BTE tool 162 T = 55  inlb lt hand gripping 120 seconds  Other exercises 14:  use lt hand press cone right side up into putty 20x and press cone upside down into putty 20x  Assessment  Performance deficits / Impairments: Decreased ROM, Decreased fine motor control, Decreased strength  Assessment: See OT exercises for lt UE(hand/wrist ) therapeutic exercises to improve lt thumb rom and lt hand strength.   Treatment Diagnosis: Lt hand/wrist weakness, decrease motion/stiffness lt thumb/wrist, impaired lt hand coordination  Prognosis: Good  REQUIRES OT FOLLOW UP: Yes  Discharge Recommendations: Outpatient OT           Plan  REQUIRES OT FOLLOW UP: Yes  Plan  Times per week: 2  Plan weeks: 5-6 weeks  Current Treatment Recommendations: ROM, Strengthening, Patient/Caregiver Education & Training (coordination)  Plan Comment: continue OT  OT Individual Minutes  Time In: 5871  Time Out: 1350  Minutes: 52  Time Code Minutes   Timed Code Treatment Minutes: 52 Minutes    Electronically signed by Suhail Devi OT on 8/17/18 at 4:57 PM          Treatment Charges:  Minutes Units   Ultrasound 5 0   Electrical Stim     Iontophoresis     Paraffin      Massage     Eval     ADL      Ther Exercise 47 3   Ther Activities     Neuro Re-Ed     Splinting      Other     Total Treatment Time:  52

## 2018-08-17 NOTE — PROGRESS NOTES
HAND LEFT (MIN 3 VIEWS)     Standing Status:   Future     Standing Expiration Date:   9/17/2018     Order Specific Question:   Reason for exam:     Answer:   Pain    XR FOOT LEFT (MIN 3 VIEWS)     Standing Status:   Future     Standing Expiration Date:   9/17/2018    CBC Auto Differential     Standing Status:   Future     Standing Expiration Date:   8/17/2019    Hemoglobin A1C     Standing Status:   Future     Standing Expiration Date:   8/17/2019    Vitamin B12     Standing Status:   Future     Standing Expiration Date:   8/17/2019    Comprehensive Metabolic Panel     Standing Status:   Future     Standing Expiration Date:   8/17/2019    Path Review, Smear    [facility], Michael Garcia MD     Referral Priority:   Routine     Referral Type:   Consult for Advice and Opinion     Referral Reason:   Specialty Services Required     Referred to Provider:   Merced Severe, MD     Requested Specialty:   Plastic Surgery     Number of Visits Requested:   1    POCT glycosylated hemoglobin (Hb A1C)      Will get a left hand x-ray and refer to Dr. Alejo Laura    Will cont with the exercises for the vertigo    Will cont with exercises for her right piriformis    Will get an x-ray of her left foot for the nodule, but I did state most likely OA    Will have her watch carbs in moderation - HGBA1C in the office was 5.6%    Will repeat CBC with diff and peripheral smear in 6 months    Will have her cont with MVI and will recheck a B12 level    Rest of systems unchanged, continue current treatments. Medications, labs, diagnostic studies, consultations and follow-up as documented in this encounter.  Rest of systems unchanged, continue current treatments        Leonard Adams MD

## 2018-08-22 ENCOUNTER — HOSPITAL ENCOUNTER (OUTPATIENT)
Dept: OCCUPATIONAL THERAPY | Age: 63
Setting detail: THERAPIES SERIES
Discharge: HOME | End: 2018-08-22
Payer: COMMERCIAL

## 2018-08-22 PROCEDURE — 97110 THERAPEUTIC EXERCISES: CPT

## 2018-08-22 NOTE — PROGRESS NOTES
resistance BTE tool 162 for lt hand strengthening. See OT exercises for PROM  lt hand/wrist, ultrasound, dexterity and strengthening.    Treatment Diagnosis: Lt hand/wrist weakness, decrease motion/stiffness lt thumb/wrist, impaired lt hand coordination  Prognosis: Good  REQUIRES OT FOLLOW UP: Yes  Discharge Recommendations: Outpatient OT           Plan  REQUIRES OT FOLLOW UP: Yes  Plan  Times per week: 2  Plan weeks: 5-6 weeks  Current Treatment Recommendations: ROM, Strengthening, Patient/Caregiver Education & Training (coordination)  Plan Comment: continue OT  OT Individual Minutes  Time In: 4012  Time Out: 0798  Minutes: 48  Time Code Minutes   Timed Code Treatment Minutes: 48 Minutes    Electronically signed by Jose Alfredo Macias OT on 8/22/18 at 1:49 PM          Treatment Charges:  Minutes Units   Ultrasound     Electrical Stim     Iontophoresis     Paraffin      Massage     Eval     ADL      Ther Exercise 48 3   Ther Activities     Neuro Re-Ed     Splinting      Other     Total Treatment Time:  48

## 2018-08-24 ENCOUNTER — HOSPITAL ENCOUNTER (OUTPATIENT)
Dept: OCCUPATIONAL THERAPY | Age: 63
Setting detail: THERAPIES SERIES
Discharge: HOME | End: 2018-08-24
Payer: COMMERCIAL

## 2018-08-24 PROCEDURE — 97110 THERAPEUTIC EXERCISES: CPT

## 2018-08-29 ENCOUNTER — HOSPITAL ENCOUNTER (OUTPATIENT)
Dept: OCCUPATIONAL THERAPY | Age: 63
Setting detail: THERAPIES SERIES
Discharge: HOME | End: 2018-08-29
Payer: COMMERCIAL

## 2018-08-29 PROCEDURE — 97110 THERAPEUTIC EXERCISES: CPT

## 2018-08-29 NOTE — PROGRESS NOTES
dexterity and strengthening exercises. See OT exercises for details.    Treatment Diagnosis: Lt hand/wrist weakness, decrease motion/stiffness lt thumb/wrist, impaired lt hand coordination  Prognosis: Good  REQUIRES OT FOLLOW UP: Yes  Discharge Recommendations: Outpatient OT           Plan  REQUIRES OT FOLLOW UP: Yes  Plan  Times per week: 2  Plan weeks: 5-6 weeks  Current Treatment Recommendations: ROM, Strengthening, Patient/Caregiver Education & Training (coordination)  Plan Comment: continue OT  OT Individual Minutes  Time In: 8645  Time Out: 0755  Minutes: 43  Time Code Minutes   Timed Code Treatment Minutes: 43 Minutes    Electronically signed by Mehrdad Huston OT on 8/29/18 at 1:59 PM          Treatment Charges:  Minutes Units   Ultrasound     Electrical Stim     Iontophoresis     Paraffin      Massage     Eval     ADL      Ther Exercise 43 3   Ther Activities     Neuro Re-Ed     Splinting      Other     Total Treatment Time:  43

## 2018-08-31 ENCOUNTER — HOSPITAL ENCOUNTER (OUTPATIENT)
Dept: OCCUPATIONAL THERAPY | Age: 63
Setting detail: THERAPIES SERIES
Discharge: HOME | End: 2018-08-31
Payer: COMMERCIAL

## 2018-08-31 PROCEDURE — 97110 THERAPEUTIC EXERCISES: CPT

## 2018-08-31 NOTE — PROGRESS NOTES
[address] [facility]  Rehabilitation Services  Occupational Therapy Treatment Note  Date: 18  Patient Name: Chidi Lee    MRN: [id number]  Account: [de-identified]   : 1955  (66 y.o.) Gender: female     General  Referring Practitioner: Dr Jona Kerr  Diagnosis: sprain of lt thumb S63.602A ICD-10 CM   OT Visit Information  OT Insurance Information: [employer]  Total # of Visits Approved: 12  Total # of Visits to Date: 11  Subjective  Subjective: \"I think I am improving so I would like to continue\"  Comments: Pt reports that MD thinks she may need surgery and does not think that therapy is going to help, but that if she feels as if it is helping than she should continue with it. Pain Assessment  Patient Currently in Pain: Denies     Other exercises  Other exercises 2: Massage and PROM lt wrist/thumb  Other exercises 3: Ultrasound 1.0 w/cm 2 lt  radial side wrist/thumb  Other exercises 9: red theraband 10# flexbar - bilateral UE 25x (make U, make upside down U, twist, make C,make backward C)  Other exercises 10: graded clothespins: lt hand place/remove yellow min resistance, red medium resistance, green moderate resistance, blue heavy resistance, black very heavy resist.   Other exercises 14:  use lt hand press cone right side up into putty 25x and press cone upside down into putty 25x  Other exercises 15: Velcro roller #7 x2 up and back; #1 x5 up and back; #3 x5 up and back  Assessment  Performance deficits / Impairments: Decreased ROM, Decreased fine motor control, Decreased strength  Assessment: OT treatment focused on increasing ROM/strength and functional use of L hand/wrist - see OT exercise sheet for detailed report. OT added velcro roller exercises for increased strength/coordination with thumb in extended position and to simulate exercise that patient does in her drumming class that she reports is difficult for her.   OT educated patient on focusing more on exercises that promote thumb

## 2018-09-07 ENCOUNTER — HOSPITAL ENCOUNTER (OUTPATIENT)
Dept: OCCUPATIONAL THERAPY | Age: 63
Setting detail: THERAPIES SERIES
Discharge: HOME | End: 2018-09-07
Payer: COMMERCIAL

## 2018-09-07 PROCEDURE — 97110 THERAPEUTIC EXERCISES: CPT

## 2018-09-07 NOTE — PROGRESS NOTES
exercises 13: BTE tool 162 T =60  inlb lt hand gripping 120 seconds  Other exercises 15: Velcro roller #7 x 10 up and back; #1 x 10 up and back; #3 x 10 up and back  Other exercises 16: velcro  checkers - remove all  1 x 1' pieces that have loop on top using lt thumb, then place all pieces on board pressing down w thumb. Assessment  Performance deficits / Impairments: Decreased ROM, Decreased fine motor control, Decreased strength  Assessment: Pt reports compliance w lt hand HEP for rom and strengthening. Pt making progress w lt thumb AROM. Pt still has difficulty lifting/raising thumb off of table. Pt demonstrates increase lt  strength. Weakness lt thumb extensors and tip pinch. Pt would benefit from continued OT to address goals of improve lt hand strength and lt thumb IP and MP extension. .  Treatment Diagnosis: Lt hand/wrist weakness, decrease motion/stiffness lt thumb/wrist, impaired lt hand coordination  Prognosis: Good  Patient Education: HEP upgraded to include pt pressing down into putty w lt thumb for strengthening  Barriers to Learning: none  REQUIRES OT FOLLOW UP: Yes  Discharge Recommendations: Outpatient OT   Plan  REQUIRES OT FOLLOW UP: Yes  Plan  Times per week: 2  Plan weeks: 3  Current Treatment Recommendations: ROM, Strengthening, Patient/Caregiver Education & Training (coordination)  Plan Comment:  Recommend continuing OT 3 more weeks.   OT Individual Minutes  Time In: 0218  Time Out: [id number]  Minutes: 59  Time Code Minutes   Timed Code Treatment Minutes: 44 Minutes  Treatment Plan:  [x] Therapeutic Exercise      [x] Instruction in HEP       Frequency: Recommend continue OT 2 X/wk x     3     wk's    [x] Plans/Goals, Risk/Benefits discussed with pt  Comprehension of Education [x] D/V Understanding   [] Needs Review  Pt Education: [x] Verbal  [x] Demo  [] Written    Regulatory Requirements:     I have reviewed this plan of care and certify a need for Medically necessary rehabilitation

## 2018-09-19 ENCOUNTER — HOSPITAL ENCOUNTER (OUTPATIENT)
Dept: OCCUPATIONAL THERAPY | Age: 63
Setting detail: THERAPIES SERIES
Discharge: HOME | End: 2018-09-19
Payer: COMMERCIAL

## 2018-09-19 PROCEDURE — 97110 THERAPEUTIC EXERCISES: CPT

## 2018-09-19 PROCEDURE — 97168 OT RE-EVAL EST PLAN CARE: CPT

## 2018-09-19 PROCEDURE — 97033 APP MDLTY 1+IONTPHRSIS EA 15: CPT

## 2018-09-19 NOTE — PROGRESS NOTES
[facility]   Occupational Therapy Re-Evaluation  Date: 18  Patient Name: Vito Mcclure      MRN: [id number]  Account: [de-identified]   : 1955  (66 y.o.)  Gender: female   Referring Practitioner:  Dr Bal Kerr  Diagnosis: lt posterior interosseous syndrome, sprain of lt thumb S63.602A ICD-10 CM   OT Visit Information  OT Insurance Information: [employer]  Total # of Visits Approved: 21  Total # of Visits to Date: 15  Progress Note Counter: Pt will see Dr Monica Joseph in a couple weeks. Pt has script from Dr Monica Joseph for ultrasound to forearm at extensor compartment, massage, iontophoresis, active exercise. Past Medical History:  has a past medical history of History of renal calculi; Other chronic cystitis with hematuria; Plantar fasciitis; and Right carpal tunnel syndrome. Past Surgical History:   has a past surgical history that includes Tonsillectomy (); cyst removal (10/15); lipoma resection (Left, 10/15); Colonoscopy (11/15); and Skin cancer excision (Left, 2017). Problem List: has History of renal calculi; Other chronic cystitis with hematuria; Bilateral plantar fasciitis; Leukopenia; Chronic eczematous otitis externa of left ear; Chronic midline low back pain without sciatica; and Hyperglycemia on her problem list.  Pain Assessment  Patient Currently in Pain: Denies  Subjective  Subjective: pt has tightness dorsal side lt hand. Pt reports being able to extend lt thumb DIP more. Pt states her computer typing is better using lt hand. Comments: Pt has a new diagnosis of lt  posterior insterosseous syndrome.     Objective   UE Function  Left Hand PROM (degrees)  Left Hand PROM: WFL  LUE Strength  L Wrist Flex: 4+/5  L Wrist Ext: 4+/5  L Wrist Radial Deviation: 5/5  L Wrist Ulnar Deviation: 5/5  L Hand Grasp: 4+/5  L Hand Release: 4-/5  LUE Tone: Normotonic  LUE PROM (degrees)  LUE PROM: WFL  LUE AROM (degrees)  LUE OT  Goals  Patient Goals   Patient goals : To get thumb back, get movement and strength back. Pt making progress toward goal.   Short term goals  Short term goal 1: Pt will demonstrate and verbalize independence w HEP for lt thumb,hand, wrist - Upgraded HEP -Goal met  Short term goal 2: Pt will demonstrate improved lt hand fine motor coordination w computer typing. goal met'  Short term goal 3: Increase lt hand  strength by 10# to improve grasp w daily activities. Pt making progress - goal ongoing. Short term goal 4: Increase lt hand tip pinch strength by 2# . goal ongoing  Long term goals  Long term goal 1: Increase lt wrist  and hand flexor strength to 4/5 and increase lt thumb flexor & extensor strength to 4/5 to improve grasp/release strength for daily activities. Goal met for lt wrist strength. Goal met for lt hand flexors and ongoing for lt hand extensors. Goal met for lt thumb flexors & goal ongoing for lt thumb extensors. Long term goal 2: Increase AROM lt thumb: IP  flexion & extension by 10, MP flexion by 10 & extension by 15. Goal met MP flexion & extension lag . Goal ongoing thumb IP flexion & extension. Revised goal increase MP extension by 5 compared to 9-19-18 measurements. Long term goal 4: Pt will be able to  lift lt thumb off table (when palm is resting on table) and hold for 10 seconds. Goal ongoing.    Plan  REQUIRES OT FOLLOW UP: Yes  Plan  Times per week: 3  Plan weeks: 2-3 weeks  Current Treatment Recommendations: ROM, Strengthening, Patient/Caregiver Education & Training (coordination)  Plan Comment: continue OT  OT Individual Minutes  Time In: 0784  Time Out: 8480  Minutes: 60  Time Code Minutes   Timed Code Treatment Minutes: 40 Minutes  Rehab Potential:  [x] Good  [] Fair  [] Poor   Suggested Professional Referral:  [x] No  [] Yes:  Barriers to Goal Achievement:  [x] No  [] Yes: Domestic Concerns:  [x] No  [] Yes:  Treatment Plan:  [x] Therapeutic Exercise    [x] Modalities:  [x] Ultrasound   [x] Instruction in HEP       [x] Iontophoresis: 4 mg/mL     Dexamethasone Sodium     Phosphate 40-80mAmin   Frequency:      3     X/wk x 2-3         wk's    [x] Plans/Goals, Risk/Benefits discussed with pt  Comprehension of Education [x] D/V Understanding  [] Needs Review  Pt Education: [x] Verbal  [x] Demo  [] Written    Regulatory Requirements:   I have reviewed this plan of care and certify a need for Medically necessary rehabilitation services.         [x] Physician Signature                                      Date:   [address]   [address], [address]  Phone: [phone]  Fax: [phone]  Electronically signed by Olivier Quinonez OT on 9/19/18 at 5:55 PM    Treatment Charges:  Minutes Units   Ultrasound 5 0   Electrical Stim     Iontophoresis 10 1   Paraffin      Massage     Re-Eval 15 1   ADL      Ther Exercise 30 2   Ther Activities     Neuro Re-Ed     Splinting      Other     Total Treatment Time:  60

## 2018-09-21 ENCOUNTER — HOSPITAL ENCOUNTER (OUTPATIENT)
Dept: OCCUPATIONAL THERAPY | Age: 63
Setting detail: THERAPIES SERIES
Discharge: HOME | End: 2018-09-21
Payer: COMMERCIAL

## 2018-09-21 PROCEDURE — 97033 APP MDLTY 1+IONTPHRSIS EA 15: CPT

## 2018-09-21 PROCEDURE — 97110 THERAPEUTIC EXERCISES: CPT

## 2018-09-21 NOTE — PROGRESS NOTES
Occupational [facility]   Rehabilitation Services  Occupational Therapy Treatment Note  Date: 18  Patient Name: Cami Grullon    MRN: [id number]  Account: [de-identified]   : 1955  (66 y.o.) Gender: female     General  Referring Practitioner:  Dr Sandie Madden. Usha  Diagnosis: lt posterior interosseous syndrome, sprain of lt thumb S63.602A ICD-10 CM   OT Visit Information  OT Insurance Information: [employer]  Total # of Visits Approved: 21  Total # of Visits to Date: 14  Progress Note Counter: Pt will see Dr Norah David in a couple weeks. Pt has script from Dr Norah David for ultrasound to forearm at extensor compartment, massage, iontophoresis, active exercise. Subjective  Subjective: Pt reports some tightness lt wrist/forearm extensor muscles.   Pain Assessment  Patient Currently in Pain: No        Wrist/Hand Exercises  Baps Board Screw And Unscrew Reps/Sets/Time: lt hand unscrew/screw L1,L2, L3, L4, L5 (10x each way cw/ccw)  Other exercises  Other exercises 1: iontophoresis lt wrist extensor compartment 10 minutes 4 mg/ml dexamethasone  (small ionto patch 1.5ml)  Other exercises 2: Massage and PROM lt wrist/thumb  Other exercises 3: Ultrasound 1.0 w/cm 2 lt  radial side wrist/thumb, forearm at extensor comparment 5minutes  Other exercises 8: orange power web lt hand / finger extension 25x   Other exercises 9: red theraband 10# flexbar - bilateral UE 25x (make U, make upside down U, twist, make C,make backward C)  Other exercises 10: graded clothespins: lt hand place/remove  red medium resistance, green moderate resistance, blue heavy resistance, black very heavy resist.   Other exercises 11: BTE tool 601 T = 11 inlb lt supinate/pronate 120 seconds  Other exercises 12: BTE tool 302 T = 11 inlb lt wrist RD/ seconds  Other exercises 13: BTE tool 162 T =65  inlb lt hand gripping 120 seconds  Other exercises 15: Velcro roller #7 x 10 up and back; #2 x 10 up and back; #3 x

## 2018-09-26 ENCOUNTER — HOSPITAL ENCOUNTER (OUTPATIENT)
Dept: OCCUPATIONAL THERAPY | Age: 63
Setting detail: THERAPIES SERIES
Discharge: HOME | End: 2018-09-26
Payer: COMMERCIAL

## 2018-09-26 PROCEDURE — 97110 THERAPEUTIC EXERCISES: CPT

## 2018-09-26 PROCEDURE — 97033 APP MDLTY 1+IONTPHRSIS EA 15: CPT

## 2018-09-28 ENCOUNTER — HOSPITAL ENCOUNTER (OUTPATIENT)
Dept: OCCUPATIONAL THERAPY | Age: 63
Setting detail: THERAPIES SERIES
Discharge: HOME | End: 2018-09-28
Payer: COMMERCIAL

## 2018-09-28 PROCEDURE — 97033 APP MDLTY 1+IONTPHRSIS EA 15: CPT

## 2018-09-28 PROCEDURE — 97110 THERAPEUTIC EXERCISES: CPT

## 2018-10-01 ENCOUNTER — HOSPITAL ENCOUNTER (OUTPATIENT)
Dept: OCCUPATIONAL THERAPY | Age: 63
Setting detail: THERAPIES SERIES
Discharge: HOME | End: 2018-10-01
Payer: COMMERCIAL

## 2018-10-01 PROCEDURE — 97033 APP MDLTY 1+IONTPHRSIS EA 15: CPT

## 2018-10-01 PROCEDURE — 97110 THERAPEUTIC EXERCISES: CPT

## 2018-10-01 NOTE — PROGRESS NOTES
[facility]  Rehabilitation Services  Occupational Therapy Treatment Note  Date: 10/1/18  Patient Name: Man Gann    MRN: [id number]  Account: [de-identified]   : 1955  (66 y.o.) Gender: female     General  Referring Practitioner:  Dr Patti Tilley. Usha  Diagnosis: lt posterior interosseous syndrome, sprain of lt thumb S63.602A ICD-10 CM   OT Visit Information  OT Insurance Information: [employer]  Total # of Visits Approved: 21  Total # of Visits to Date: 17  Progress Note Counter: Pt will see Dr Luanne Ponce in a couple weeks. Pt has script from Dr Luanne Ponce for ultrasound to forearm at extensor compartment, massage, iontophoresis, active exercise. Subjective  Subjective: Pt reports that she is still having difficulty \"walking down\" the drumstick.   Pain Assessment  Patient Currently in Pain: No     Wrist/Hand Exercises  Baps Board Screw And Unscrew Reps/Sets/Time: lt hand unscrew/screw L1,L2, L3, L4, L5 (10x each way cw/ccw)  Other exercises  Other exercises 1: iontophoresis lt wrist extensor compartment 10 minutes 4 mg/ml dexamethasone  (small ionto patch 1.5ml)  Other exercises 2: Massage and PROM lt wrist/thumb  Other exercises 3: Ultrasound 1.0 w/cm 2 lt  radial side wrist/thumb, forearm at extensor comparment 5minutes  Other exercises 4: red/beige pegs (1/2\" diameter cylindrical pegs) - 47 pegs using lt hand flip over & place, then place pegs  Other exercises 8: orange power web lt hand / finger extension 25x   Other exercises 9: red theraband 10# flexbar - bilateral UE 25x (make U, make upside down U, twist, make C,make backward C)  Other exercises 14:  use lt hand press cone right side up into putty 25x and press cone upside down into putty 25x  Other exercises 15: Velcro roller #7 x 10 up and back; #2 x 10 up and back; #3 x 10 up and back  Assessment  Performance deficits / Impairments: Decreased ROM, Decreased strength  Assessment: OT treatment focused on increasing

## 2018-10-03 ENCOUNTER — HOSPITAL ENCOUNTER (OUTPATIENT)
Dept: OCCUPATIONAL THERAPY | Age: 63
Setting detail: THERAPIES SERIES
Discharge: HOME | End: 2018-10-03
Payer: COMMERCIAL

## 2018-10-03 PROCEDURE — 97033 APP MDLTY 1+IONTPHRSIS EA 15: CPT

## 2018-10-03 PROCEDURE — 97110 THERAPEUTIC EXERCISES: CPT

## 2018-10-03 NOTE — PROGRESS NOTES
[facility] [facility]  Rehabilitation Services  Occupational Therapy Treatment Note  Date: 10/3/18  Patient Name: Lyudmila Anderson    MRN: [id number]  Account: [de-identified]   : 1955  (66 y.o.) Gender: female     General  Referring Practitioner:  Dr Reita Moritz. Usha  Diagnosis: lt posterior interosseous syndrome, sprain of lt thumb S63.602A ICD-10 CM   OT Visit Information  OT Insurance Information: Aetna  Total # of Visits Approved: 21  Total # of Visits to Date:   Progress Note Counter: Pt will see Dr Nick Harrison in a couple weeks. Pt has script from Dr Nick Harrison for ultrasound to forearm at extensor compartment, massage, iontophoresis, active exercise. Subjective  Subjective: \"I am noticing those little twangs more often\"  Pain Assessment  Patient Currently in Pain: No     Other exercises  Other exercises 1: iontophoresis lt wrist extensor compartment 10 minutes 4 mg/ml dexamethasone  (small ionto patch 1.5ml)  Other exercises 2: Massage and PROM lt wrist/thumb  Other exercises 3: Ultrasound 1.0 w/cm 2 lt  radial side wrist/thumb, forearm at extensor comparment 5minutes  Other exercises 7: key peg place/remove all 25 pegs using lt hand  Other exercises 8: orange power web lt hand / finger extension 25x   Other exercises 9: red theraband 10# flexbar - bilateral UE 25x (make U, make upside down U, twist, make C,make backward C)  Other exercises 10: graded clothespins: lt hand place/remove  red medium resistance, green moderate resistance, blue heavy resistance, black very heavy resistance.    Other exercises 13: BTE tool 162 T =65  inlb lt hand gripping 128 seconds  Other exercises 14:  use lt hand press cone right side up into putty 25x and press cone upside down into putty 25x  Other exercises 15: Velcro roller #7 x 10 up and back; #2 x 10 up and back; #3 x 10 up and back  Assessment  Performance deficits / Impairments: Decreased ROM, Decreased strength  Assessment: OT treatment focused on

## 2018-10-08 ENCOUNTER — HOSPITAL ENCOUNTER (OUTPATIENT)
Dept: OCCUPATIONAL THERAPY | Age: 63
Setting detail: THERAPIES SERIES
Discharge: HOME | End: 2018-10-08
Payer: COMMERCIAL

## 2018-10-08 PROCEDURE — 97033 APP MDLTY 1+IONTPHRSIS EA 15: CPT

## 2018-10-08 PROCEDURE — 97110 THERAPEUTIC EXERCISES: CPT

## 2018-10-08 NOTE — PROGRESS NOTES
Occupational [facility]   Rehabilitation Services  Occupational Therapy Treatment Note  Date: 10/8/18  Patient Name: Radha Bello    MRN: [id number]  Account: [de-identified]   : 1955  (66 y.o.) Gender: female     General  Referring Practitioner:  Dr Marina Keane. Usha  Diagnosis: lt posterior interosseous syndrome, sprain of lt thumb S63.602A ICD-10 CM   OT Visit Information  OT Insurance Information: A[employer]na  Total # of Visits Approved: 21  Total # of Visits to Date: 19  Subjective  Subjective: Pt states that she wakes up and does her exercises. Pt states she gets a benefit from the iontophoresis. Pt states she feels tightness lt forearm where she can feel her muscles when she uses lt hand. Pt reports using lt hand a lot (manipulating nut onto screw, moving lt thumb up/down drum stick, etc. Pt states she is moving lt thumb more but not as high as rt thumb. Pt states her daughter and grandchild are in town. Pain Assessment  Patient Currently in Pain: Denies           Other exercises  Other exercises 1: iontophoresis lt wrist extensor compartment 10 minutes 4 mg/ml dexamethasone  (small ionto patch 1.5ml)  Other exercises 2: Massage and PROM lt wrist/thumb  Other exercises 3: Ultrasound 1.0 w/cm 2 lt  radial side wrist/thumb, forearm at extensor comparment 5minutes  Other exercises 13: BTE tool 162 T =65  inlb lt hand gripping 120 seconds  Other exercises 15: Velcro roller #7 x 10 up and back; #2 x 10 up and back; #3 x 10 up and back  Assessment  Performance deficits / Impairments: Decreased ROM, Decreased strength  Assessment: Tx focused on modalities to tightness (ultrasound, iontophoresist) lt forearm /base of thumb extensor compartment to facilitate AROM lt thumb flexion/extension. Pt reports benefit from lt hand strengthening w BTE tool 162 for gripping and EZ exerboard  using tools for key pinch, wrist flexion/extension, and hand flexion.  See OT exercises for details.    Treatment Diagnosis: Lt hand/wrist weakness, decrease motion/stiffness lt thumb/wrist, impaired lt hand coordination  Prognosis: Good  Patient Education: OT added turning manual can opener using lt hand to pt's HEP to facilitate more lt thumb AROM  Barriers to Learning: none  REQUIRES OT FOLLOW UP: Yes  Discharge Recommendations: Outpatient OT           Plan  REQUIRES OT FOLLOW UP: Yes  Plan  Times per week: 3  Plan weeks: 2-3 weeks  Specific instructions for Next Treatment: take measurements next visit  Current Treatment Recommendations: ROM, Strengthening, Patient/Caregiver Education & Training (coordination)  Plan Comment: continue OT  OT Individual Minutes  Time In: 1550  Time Out: 6451  Minutes: 45  Time Code Minutes   Timed Code Treatment Minutes: 45 Minutes    Electronically signed by Gillian Purdy OT on 10/8/18 at 4:56 PM          Treatment Charges:  Minutes Units   Ultrasound 5 0   Electrical Stim     Iontophoresis 10 1   Paraffin      Massage     Eval     ADL      Ther Exercise 30 2   Ther Activities     Neuro Re-Ed     Splinting      Other     Total Treatment Time:  45

## 2018-10-10 ENCOUNTER — HOSPITAL ENCOUNTER (OUTPATIENT)
Dept: OCCUPATIONAL THERAPY | Age: 63
Setting detail: THERAPIES SERIES
Discharge: HOME | End: 2018-10-10
Payer: COMMERCIAL

## 2018-10-10 PROCEDURE — 97110 THERAPEUTIC EXERCISES: CPT

## 2018-10-10 PROCEDURE — 97033 APP MDLTY 1+IONTPHRSIS EA 15: CPT

## 2018-10-12 ENCOUNTER — HOSPITAL ENCOUNTER (OUTPATIENT)
Dept: OCCUPATIONAL THERAPY | Age: 63
Setting detail: THERAPIES SERIES
Discharge: HOME | End: 2018-10-12
Payer: COMMERCIAL

## 2018-10-12 PROCEDURE — 97033 APP MDLTY 1+IONTPHRSIS EA 15: CPT

## 2018-10-12 PROCEDURE — 97110 THERAPEUTIC EXERCISES: CPT

## 2019-02-13 ENCOUNTER — HOSPITAL ENCOUNTER (OUTPATIENT)
Age: 64
Setting detail: SPECIMEN
Discharge: HOME | End: 2019-02-13
Payer: COMMERCIAL

## 2019-02-13 DIAGNOSIS — R73.9 HYPERGLYCEMIA: ICD-10-CM

## 2019-02-13 DIAGNOSIS — E53.8 B12 DEFICIENCY: ICD-10-CM

## 2019-02-13 DIAGNOSIS — D72.819 LEUKOPENIA, UNSPECIFIED TYPE: ICD-10-CM

## 2019-02-13 LAB
ABSOLUTE EOS #: 0.05 K/UL (ref 0–0.44)
ABSOLUTE IMMATURE GRANULOCYTE: <0.03 K/UL (ref 0–0.3)
ABSOLUTE LYMPH #: 1.42 K/UL (ref 1.1–3.7)
ABSOLUTE MONO #: 0.32 K/UL (ref 0.1–1.2)
ABSOLUTE RETIC #: 0.06 M/UL (ref 0.03–0.08)
ALBUMIN SERPL-MCNC: 4.3 G/DL (ref 3.5–5.2)
ALBUMIN/GLOBULIN RATIO: 1.7 (ref 1–2.5)
ALP BLD-CCNC: 63 U/L (ref 35–104)
ALT SERPL-CCNC: 20 U/L (ref 5–33)
ANION GAP SERPL CALCULATED.3IONS-SCNC: 13 MMOL/L (ref 9–17)
AST SERPL-CCNC: 22 U/L
BASOPHILS # BLD: 1 % (ref 0–2)
BASOPHILS ABSOLUTE: 0.03 K/UL (ref 0–0.2)
BILIRUB SERPL-MCNC: 0.38 MG/DL (ref 0.3–1.2)
BUN BLDV-MCNC: 18 MG/DL (ref 8–23)
BUN/CREAT BLD: ABNORMAL (ref 9–20)
CALCIUM SERPL-MCNC: 9.4 MG/DL (ref 8.6–10.4)
CHLORIDE BLD-SCNC: 107 MMOL/L (ref 98–107)
CO2: 25 MMOL/L (ref 20–31)
CREAT SERPL-MCNC: 0.57 MG/DL (ref 0.5–0.9)
DIFFERENTIAL TYPE: ABNORMAL
EOSINOPHILS RELATIVE PERCENT: 2 % (ref 1–4)
ESTIMATED AVERAGE GLUCOSE: 103 MG/DL
GFR AFRICAN AMERICAN: >60 ML/MIN
GFR NON-AFRICAN AMERICAN: >60 ML/MIN
GFR SERPL CREATININE-BSD FRML MDRD: ABNORMAL ML/MIN/{1.73_M2}
GFR SERPL CREATININE-BSD FRML MDRD: ABNORMAL ML/MIN/{1.73_M2}
GLUCOSE BLD-MCNC: 101 MG/DL (ref 70–99)
HBA1C MFR BLD: 5.2 % (ref 4–6)
HCT VFR BLD CALC: 38.2 % (ref 36.3–47.1)
HEMOGLOBIN: 12.6 G/DL (ref 11.9–15.1)
IMMATURE GRANULOCYTES: 0 %
IMMATURE RETIC FRACT: 10.6 % (ref 2.7–18.3)
LYMPHOCYTES # BLD: 45 % (ref 24–43)
MCH RBC QN AUTO: 28.3 PG (ref 25.2–33.5)
MCHC RBC AUTO-ENTMCNC: 33 G/DL (ref 28.4–34.8)
MCV RBC AUTO: 85.7 FL (ref 82.6–102.9)
MONOCYTES # BLD: 10 % (ref 3–12)
NRBC AUTOMATED: 0 PER 100 WBC
PDW BLD-RTO: 12.3 % (ref 11.8–14.4)
PLATELET # BLD: 169 K/UL (ref 138–453)
PLATELET ESTIMATE: ABNORMAL
PMV BLD AUTO: 10.1 FL (ref 8.1–13.5)
POTASSIUM SERPL-SCNC: 4.6 MMOL/L (ref 3.7–5.3)
RBC # BLD: 4.46 M/UL (ref 3.95–5.11)
RBC # BLD: ABNORMAL 10*6/UL
RETIC %: 1.3 % (ref 0.5–1.9)
RETIC HEMOGLOBIN: 33.7 PG (ref 28.2–35.7)
SEG NEUTROPHILS: 42 % (ref 36–65)
SEGMENTED NEUTROPHILS ABSOLUTE COUNT: 1.34 K/UL (ref 1.5–8.1)
SODIUM BLD-SCNC: 145 MMOL/L (ref 135–144)
TOTAL PROTEIN: 6.8 G/DL (ref 6.4–8.3)
VITAMIN B-12: 350 PG/ML (ref 232–1245)
WBC # BLD: 3.2 K/UL (ref 3.5–11.3)
WBC # BLD: ABNORMAL 10*3/UL

## 2019-02-14 LAB — SURGICAL PATHOLOGY REPORT: NORMAL

## 2019-02-15 LAB — PATHOLOGIST REVIEW: NORMAL

## 2019-02-18 ENCOUNTER — OFFICE VISIT (OUTPATIENT)
Dept: FAMILY MEDICINE CLINIC | Age: 64
End: 2019-02-18
Payer: COMMERCIAL

## 2019-02-18 VITALS
SYSTOLIC BLOOD PRESSURE: 132 MMHG | WEIGHT: 186 LBS | TEMPERATURE: 97.7 F | HEIGHT: 67 IN | RESPIRATION RATE: 16 BRPM | DIASTOLIC BLOOD PRESSURE: 60 MMHG | BODY MASS INDEX: 29.19 KG/M2 | HEART RATE: 74 BPM | OXYGEN SATURATION: 98 %

## 2019-02-18 DIAGNOSIS — D72.819 LEUKOPENIA, UNSPECIFIED TYPE: ICD-10-CM

## 2019-02-18 DIAGNOSIS — R73.9 HYPERGLYCEMIA: Primary | ICD-10-CM

## 2019-02-18 DIAGNOSIS — M19.90 ARTHRITIS: ICD-10-CM

## 2019-02-18 DIAGNOSIS — Z13.220 SCREENING CHOLESTEROL LEVEL: ICD-10-CM

## 2019-02-18 PROBLEM — E53.8 B12 DEFICIENCY: Status: RESOLVED | Noted: 2019-02-18 | Resolved: 2019-02-18

## 2019-02-18 PROBLEM — E53.8 B12 DEFICIENCY: Status: ACTIVE | Noted: 2019-02-18

## 2019-02-18 PROCEDURE — G8419 CALC BMI OUT NRM PARAM NOF/U: HCPCS | Performed by: FAMILY MEDICINE

## 2019-02-18 PROCEDURE — G8427 DOCREV CUR MEDS BY ELIG CLIN: HCPCS | Performed by: FAMILY MEDICINE

## 2019-02-18 PROCEDURE — 3017F COLORECTAL CA SCREEN DOC REV: CPT | Performed by: FAMILY MEDICINE

## 2019-02-18 PROCEDURE — 99214 OFFICE O/P EST MOD 30 MIN: CPT | Performed by: FAMILY MEDICINE

## 2019-02-18 PROCEDURE — G8484 FLU IMMUNIZE NO ADMIN: HCPCS | Performed by: FAMILY MEDICINE

## 2019-02-18 PROCEDURE — 1036F TOBACCO NON-USER: CPT | Performed by: FAMILY MEDICINE

## 2019-02-18 ASSESSMENT — ENCOUNTER SYMPTOMS
ABDOMINAL DISTENTION: 0
NAUSEA: 0
ABDOMINAL PAIN: 0
COUGH: 0
SHORTNESS OF BREATH: 0
CHEST TIGHTNESS: 0
BACK PAIN: 0
VOMITING: 0
SORE THROAT: 0
RHINORRHEA: 0
CONSTIPATION: 0
DIARRHEA: 0

## 2019-02-18 ASSESSMENT — PATIENT HEALTH QUESTIONNAIRE - PHQ9
1. LITTLE INTEREST OR PLEASURE IN DOING THINGS: 0
SUM OF ALL RESPONSES TO PHQ QUESTIONS 1-9: 0
2. FEELING DOWN, DEPRESSED OR HOPELESS: 0
SUM OF ALL RESPONSES TO PHQ9 QUESTIONS 1 & 2: 0
SUM OF ALL RESPONSES TO PHQ QUESTIONS 1-9: 0

## 2019-07-24 ENCOUNTER — HOSPITAL ENCOUNTER (OUTPATIENT)
Dept: WOMENS IMAGING | Age: 64
Discharge: HOME | End: 2019-07-26
Payer: COMMERCIAL

## 2019-07-24 DIAGNOSIS — Z12.39 SCREENING FOR BREAST CANCER: ICD-10-CM

## 2019-07-24 PROCEDURE — 77063 BREAST TOMOSYNTHESIS BI: CPT

## 2020-02-06 ENCOUNTER — HOSPITAL ENCOUNTER (OUTPATIENT)
Age: 65
Setting detail: SPECIMEN
Discharge: HOME | End: 2020-02-06
Payer: COMMERCIAL

## 2020-02-06 LAB
ABSOLUTE EOS #: 0.07 K/UL (ref 0–0.44)
ABSOLUTE IMMATURE GRANULOCYTE: <0.03 K/UL (ref 0–0.3)
ABSOLUTE LYMPH #: 1.67 K/UL (ref 1.1–3.7)
ABSOLUTE MONO #: 0.31 K/UL (ref 0.1–1.2)
ALBUMIN SERPL-MCNC: 4.3 G/DL (ref 3.5–5.2)
ALBUMIN/GLOBULIN RATIO: 1.7 (ref 1–2.5)
ALP BLD-CCNC: 64 U/L (ref 35–104)
ALT SERPL-CCNC: 17 U/L (ref 5–33)
ANION GAP SERPL CALCULATED.3IONS-SCNC: 14 MMOL/L (ref 9–17)
AST SERPL-CCNC: 18 U/L
BASOPHILS # BLD: 1 % (ref 0–2)
BASOPHILS ABSOLUTE: 0.04 K/UL (ref 0–0.2)
BILIRUB SERPL-MCNC: 0.38 MG/DL (ref 0.3–1.2)
BUN BLDV-MCNC: 17 MG/DL (ref 8–23)
BUN/CREAT BLD: NORMAL (ref 9–20)
CALCIUM SERPL-MCNC: 9.6 MG/DL (ref 8.6–10.4)
CHLORIDE BLD-SCNC: 105 MMOL/L (ref 98–107)
CHOLESTEROL/HDL RATIO: 2.5
CHOLESTEROL: 174 MG/DL
CO2: 24 MMOL/L (ref 20–31)
CREAT SERPL-MCNC: 0.65 MG/DL (ref 0.5–0.9)
DIFFERENTIAL TYPE: ABNORMAL
EOSINOPHILS RELATIVE PERCENT: 2 % (ref 1–4)
ESTIMATED AVERAGE GLUCOSE: 111 MG/DL
GFR AFRICAN AMERICAN: >60 ML/MIN
GFR NON-AFRICAN AMERICAN: >60 ML/MIN
GFR SERPL CREATININE-BSD FRML MDRD: NORMAL ML/MIN/{1.73_M2}
GFR SERPL CREATININE-BSD FRML MDRD: NORMAL ML/MIN/{1.73_M2}
GLUCOSE BLD-MCNC: 98 MG/DL (ref 70–99)
HBA1C MFR BLD: 5.5 % (ref 4–6)
HCT VFR BLD CALC: 39.6 % (ref 36.3–47.1)
HDLC SERPL-MCNC: 70 MG/DL
HEMOGLOBIN: 12.7 G/DL (ref 11.9–15.1)
IMMATURE GRANULOCYTES: 0 %
LDL CHOLESTEROL: 93 MG/DL (ref 0–130)
LYMPHOCYTES # BLD: 49 % (ref 24–43)
MCH RBC QN AUTO: 27.9 PG (ref 25.2–33.5)
MCHC RBC AUTO-ENTMCNC: 32.1 G/DL (ref 28.4–34.8)
MCV RBC AUTO: 86.8 FL (ref 82.6–102.9)
MONOCYTES # BLD: 9 % (ref 3–12)
NRBC AUTOMATED: 0 PER 100 WBC
PDW BLD-RTO: 12.9 % (ref 11.8–14.4)
PLATELET # BLD: 193 K/UL (ref 138–453)
PLATELET ESTIMATE: ABNORMAL
PMV BLD AUTO: 9.8 FL (ref 8.1–13.5)
POTASSIUM SERPL-SCNC: 4.4 MMOL/L (ref 3.7–5.3)
RBC # BLD: 4.56 M/UL (ref 3.95–5.11)
RBC # BLD: ABNORMAL 10*6/UL
SEG NEUTROPHILS: 39 % (ref 36–65)
SEGMENTED NEUTROPHILS ABSOLUTE COUNT: 1.35 K/UL (ref 1.5–8.1)
SODIUM BLD-SCNC: 143 MMOL/L (ref 135–144)
TOTAL PROTEIN: 6.9 G/DL (ref 6.4–8.3)
TRIGL SERPL-MCNC: 56 MG/DL
VLDLC SERPL CALC-MCNC: NORMAL MG/DL (ref 1–30)
WBC # BLD: 3.5 K/UL (ref 3.5–11.3)
WBC # BLD: ABNORMAL 10*3/UL

## 2020-02-19 ENCOUNTER — OFFICE VISIT (OUTPATIENT)
Dept: FAMILY MEDICINE CLINIC | Age: 65
End: 2020-02-19
Payer: COMMERCIAL

## 2020-02-19 VITALS
DIASTOLIC BLOOD PRESSURE: 76 MMHG | TEMPERATURE: 97.8 F | HEIGHT: 67 IN | OXYGEN SATURATION: 98 % | SYSTOLIC BLOOD PRESSURE: 128 MMHG | HEART RATE: 84 BPM | RESPIRATION RATE: 16 BRPM | BODY MASS INDEX: 30.13 KG/M2 | WEIGHT: 192 LBS

## 2020-02-19 PROBLEM — D72.819 LEUKOPENIA: Status: RESOLVED | Noted: 2017-09-15 | Resolved: 2020-02-19

## 2020-02-19 PROCEDURE — G8484 FLU IMMUNIZE NO ADMIN: HCPCS | Performed by: FAMILY MEDICINE

## 2020-02-19 PROCEDURE — 99396 PREV VISIT EST AGE 40-64: CPT | Performed by: FAMILY MEDICINE

## 2020-02-19 ASSESSMENT — ENCOUNTER SYMPTOMS
DIARRHEA: 0
SORE THROAT: 0
VOMITING: 0
NAUSEA: 0
BACK PAIN: 0
ABDOMINAL PAIN: 0
CHEST TIGHTNESS: 0
ABDOMINAL DISTENTION: 0
COUGH: 0
SHORTNESS OF BREATH: 0
CONSTIPATION: 0
RHINORRHEA: 0

## 2020-02-19 ASSESSMENT — PATIENT HEALTH QUESTIONNAIRE - PHQ9
2. FEELING DOWN, DEPRESSED OR HOPELESS: 0
SUM OF ALL RESPONSES TO PHQ9 QUESTIONS 1 & 2: 0
SUM OF ALL RESPONSES TO PHQ QUESTIONS 1-9: 0
1. LITTLE INTEREST OR PLEASURE IN DOING THINGS: 0
SUM OF ALL RESPONSES TO PHQ QUESTIONS 1-9: 0

## 2020-02-19 NOTE — PROGRESS NOTES
Susie Dubon MD  [address] [facility]  [address] [place]  [facility] [place] [address]  Dept: [phone]  Dept Fax: [phone]    Patient ID: Jai Lucas is a 60 y.o. female. HPI    Pt here today for an annual physical and review of labs. Today, patient complains of  Mil bunions and wants to see a podiatrist.  Pt denies any fever or chills. Pt today denies any HA, chest pain, or SOB. Pt denies any N/V/D/C or abdominal pain. Otherwise pt doing well on current tx and no other concerns today. The patient's past medical, surgical, social, and family history as well as his current medications and allergies were reviewed as documented in today's encounter. No current outpatient medications on file prior to visit. No current facility-administered medications on file prior to visit. Subjective:     Review of Systems   Constitutional: Negative for appetite change, fatigue and fever. HENT: Negative for congestion, ear pain, rhinorrhea and sore throat. Respiratory: Negative for cough, chest tightness and shortness of breath. Cardiovascular: Negative for chest pain and palpitations. Gastrointestinal: Negative for abdominal distention, abdominal pain, constipation, diarrhea, nausea and vomiting. Genitourinary: Negative for difficulty urinating and dysuria. Musculoskeletal: Negative for arthralgias, back pain and myalgias. Skin: Negative for rash. Neurological: Negative for dizziness, weakness, light-headedness and headaches. Hematological: Negative for adenopathy. Psychiatric/Behavioral: Negative for behavioral problems and sleep disturbance. The patient is not nervous/anxious. Objective:     Physical Exam  Vitals signs reviewed. Constitutional:       General: She is not in acute distress. Appearance: She is well-developed. HENT:      Head: Normocephalic and atraumatic.       Right Ear: External ear normal.      Left Ear: External ear normal.      Nose: Nose normal.      Mouth/Throat:      Pharynx: No oropharyngeal exudate. Eyes:      Conjunctiva/sclera: Conjunctivae normal.      Pupils: Pupils are equal, round, and reactive to light. Neck:      Musculoskeletal: Normal range of motion. Cardiovascular:      Rate and Rhythm: Normal rate and regular rhythm. Heart sounds: Normal heart sounds. No murmur. Pulmonary:      Effort: Pulmonary effort is normal. No respiratory distress. Breath sounds: Normal breath sounds. No wheezing. Chest:      Chest wall: No tenderness. Abdominal:      General: Bowel sounds are normal. There is no distension. Palpations: Abdomen is soft. There is no mass. Tenderness: There is no abdominal tenderness. Musculoskeletal: Normal range of motion. General: No tenderness. Lymphadenopathy:      Cervical: No cervical adenopathy. Skin:     Findings: No rash. Neurological:      Mental Status: She is alert and oriented to person, place, and time. Deep Tendon Reflexes: Reflexes are normal and symmetric. Psychiatric:         Behavior: Behavior normal.     Labs reviewed with pt today. Assessment:      Diagnosis Orders   1. Annual physical exam     2. Hyperglycemia     3. Leukopenia, unspecified type      improved   4. Arthritis     5. Bilateral bunions  Anna Olmedo [place], [facility]       Plan: Will cont with current treatment as pt is currently stable on current treatment    Continue to watch carbs secondary to mildly increased BS - HGBA1C stable at 5.5%    Will put in a referral to Dr. Charles Reeder or very mild bunions - she wants to be proactive    Rest of systems unchanged, continue current treatments. Medications, labs, diagnostic studies, consultations and follow-up as documented in this encounter. Rest of systems unchanged, continue current treatments    Susie Tabares MD

## 2020-02-25 ENCOUNTER — TELEPHONE (OUTPATIENT)
Dept: FAMILY MEDICINE CLINIC | Age: 65
End: 2020-02-25

## 2020-02-25 PROBLEM — R73.9 HYPERGLYCEMIA: Status: RESOLVED | Noted: 2018-08-17 | Resolved: 2020-02-25

## 2020-02-25 NOTE — TELEPHONE ENCOUNTER
Patient called and expressed concern with a few diagnoses in her chart. Leukopenia, hyperglycemia, and arthritis. She said one time of her wbc count being low shouldn't have to become a dxs in her chart, as well as having elevated blood sugar. She said that she was never tested for arthritis. She asked if those 3 diagnoses could be removed if you see fit. She is worried that when she goes to look for new life insurance that she will be turned down. She also wanted to know how often she needed to have a pap.   Her last one was 09/13/2017

## 2020-02-25 NOTE — TELEPHONE ENCOUNTER
-The leukopenia is resolved- this is no longer in her problem list  -The hyperglycemia is resolved- no longer active in problem list  -Arthritis has been noted in previous X-rays  -We cannot alter her medical hx but I did make sure her problem list is UTD  -Next PAP will be due September 13, 2022

## 2020-09-10 ENCOUNTER — HOSPITAL ENCOUNTER (OUTPATIENT)
Dept: WOMENS IMAGING | Age: 65
Discharge: HOME | End: 2020-09-12
Payer: COMMERCIAL

## 2020-09-10 PROCEDURE — 77063 BREAST TOMOSYNTHESIS BI: CPT

## 2020-10-28 ENCOUNTER — OFFICE VISIT (OUTPATIENT)
Dept: ORTHOPEDIC SURGERY | Age: 65
End: 2020-10-28
Payer: COMMERCIAL

## 2020-10-28 PROCEDURE — 3017F COLORECTAL CA SCREEN DOC REV: CPT | Performed by: ORTHOPAEDIC SURGERY

## 2020-10-28 PROCEDURE — 1036F TOBACCO NON-USER: CPT | Performed by: ORTHOPAEDIC SURGERY

## 2020-10-28 PROCEDURE — G8428 CUR MEDS NOT DOCUMENT: HCPCS | Performed by: ORTHOPAEDIC SURGERY

## 2020-10-28 PROCEDURE — G8417 CALC BMI ABV UP PARAM F/U: HCPCS | Performed by: ORTHOPAEDIC SURGERY

## 2020-10-28 PROCEDURE — 99203 OFFICE O/P NEW LOW 30 MIN: CPT | Performed by: ORTHOPAEDIC SURGERY

## 2020-10-28 PROCEDURE — G8484 FLU IMMUNIZE NO ADMIN: HCPCS | Performed by: ORTHOPAEDIC SURGERY

## 2020-10-28 NOTE — PROGRESS NOTES
Damian Peterson M.D.            [address] [place] [address]., [address] [facility], [id number] [place] [address]           Dept Phone: [phone]           Dept Fax:  [phone]           Elisa Quiles          Dept Phone: [phone]           Dept Fax:  [phone]      Chief Compliant:  Chief Complaint   Patient presents with    Pain      Rt CTS        History of Present Illness: This is a 63 y.o. female who presents to the clinic today for evaluation / follow up of right carpal tunnel syndrome. Patient gives a classic history of nocturnal paresthesias as well as paresthesias with his cell phone or steering wheel. She has tried nocturnal splints in the past and most recently. She has a sleep with her arm down her to side. Patient did have an EMG and NCV in the past which showed mild to moderate median neuropathy at that time. .       Review of Systems   Constitutional: Negative for fever, chills, sweats. Eyes: Negative for changes in vision, or pain. HENT: Negative for ear ache, epistaxis, or sore throat. Respiratory/Cardio: Negative for Chest pain, palpitations, SOB, or cough. Gastrointestinal: Negative for abdominal pain, N/V/D. Genitourinary: Negative for dysuria, frequency, urgency, or hematuria. Neurological: Negative for headache, numbness, or weakness. Integumentary: Negative for rash, itching, laceration, or abrasion. Musculoskeletal: Positive for Pain ( Rt CTS)       Physical Exam:  Constitutional: Patient is oriented to person, place, and time. Patient appears well-developed and well nourished. HENT: Negative otherwise noted  Head: Normocephalic and Atraumatic  Nose: Normal  Eyes: Conjunctivae and EOM are normal  Neck: Normal range of motion Neck supple. Respiratory/Cardio: Effort normal. No respiratory distress.   Musculoskeletal: Physical examination the patient's right hand notes that she has an ongoing Tinel's within the Phalen's. She has a positive Tinel's. She has decent  strength. No obvious thenar atrophy. No ulnar findings. She has no problems with Tinel's at the elbow. Neurological: Patient is alert and oriented to person, place, and time. Normal strenght. No sensory deficit. Skin: Skin is warm and dry  Psychiatric: Behavior is normal. Thought content normal.  Nursing note and vitals reviewed. Labs and Imaging:     XR taken today:  No results found. No orders of the defined types were placed in this encounter. Assessment and Plan:  1. Carpal tunnel syndrome of right wrist            This is a 63 y.o. female who presents to the clinic today for evaluation / follow up of carpal tunnel syndrome right. Past History:  No current outpatient medications on file.   No Known Allergies  Social History     Socioeconomic History    Marital status:      Spouse name: Not on file    Number of children: Not on file    Years of education: Not on file    Highest education level: Not on file   Occupational History    Not on file   Social Needs    Financial resource strain: Not on file    Food insecurity     Worry: Not on file     Inability: Not on file    Transportation needs     Medical: Not on file     Non-medical: Not on file   Tobacco Use    Smoking status: Never Smoker    Smokeless tobacco: Never Used   Substance and Sexual Activity    Alcohol use: No    Drug use: No    Sexual activity: Yes     Partners: Male   Lifestyle    Physical activity     Days per week: Not on file     Minutes per session: Not on file    Stress: Not on file   Relationships    Social connections     Talks on phone: Not on file     Gets together: Not on file     Attends Synagogue service: Not on file     Active member of club or organization: Not on file     Attends meetings of clubs or organizations: Not on file     Relationship status: Not on file    Intimate partner violence     Fear of current or ex partner: Not on file     Emotionally abused: Not on file     Physically abused: Not on file     Forced sexual activity: Not on file   Other Topics Concern    Not on file   Social History Narrative    Not on file     Past Medical History:   Diagnosis Date    History of renal calculi 11/14    Hyperglycemia 8/17/2018    Other chronic cystitis with hematuria     saw Dr. Cachorro Mary Plantar fasciitis 11/14    right > left saw Dr. Antoni Junior Right carpal tunnel syndrome     per Dr. Quintero Heart, is wearing splint at [address]     Past Surgical History:   Procedure Laterality Date    COLONOSCOPY  11/15    per Dr. Elisabeth Marte. Elnita Signs  10/15    posterior scalp    LIPOMA RESECTION Left 10/15    leg above knee    SKIN CANCER EXCISION Left 06/2017    hand    TONSILLECTOMY  1963     Family History   Problem Relation Age of Onset    Other Father         etoh    Cancer Father 62        lung cancer    Alcohol Abuse Father     COPD Father     Brain Cancer Father     Lung Cancer Father     Heart Disease Mother     Coronary Art Dis Brother    Plan  Patient has a positive EMG and NCV for carpal tunnel syndrome. She has failed nocturnal splints. She wished to proceed with carpal tunnel release. We discussed the risk and benefits of this. We will get her scheduled accordingly      Provider Attestation:  Bry Perez, personally performed the services described in this documentation. All medical record entries made by the scribe were at my direction and in my presence. I have reviewed the chart and discharge instructions and agree that the records reflect my personal performance and is accurate and complete. Yana Laughlin MD. 10/28/20      Please note that this chart was generated using voice recognition Dragon dictation software.   Although every effort was made to ensure the accuracy of this automated transcription, some errors in transcription may have occurred.

## 2020-11-05 ENCOUNTER — HOSPITAL ENCOUNTER (OUTPATIENT)
Dept: PREADMISSION TESTING | Age: 65
Discharge: HOME | End: 2020-11-09
Payer: COMMERCIAL

## 2020-11-05 VITALS
WEIGHT: 191.7 LBS | BODY MASS INDEX: 30.09 KG/M2 | RESPIRATION RATE: 12 BRPM | TEMPERATURE: 97 F | OXYGEN SATURATION: 99 % | SYSTOLIC BLOOD PRESSURE: 132 MMHG | DIASTOLIC BLOOD PRESSURE: 79 MMHG | HEIGHT: 67 IN | HEART RATE: 75 BPM

## 2020-11-05 LAB
ABSOLUTE EOS #: 0.04 K/UL (ref 0–0.4)
ABSOLUTE IMMATURE GRANULOCYTE: NORMAL K/UL (ref 0–0.3)
ABSOLUTE LYMPH #: 1.33 K/UL (ref 1–4.8)
ABSOLUTE MONO #: 0.27 K/UL (ref 0.1–1.3)
ANION GAP SERPL CALCULATED.3IONS-SCNC: 7 MMOL/L (ref 9–17)
BASOPHILS # BLD: 0 % (ref 0–2)
BASOPHILS ABSOLUTE: 0 K/UL (ref 0–0.2)
BUN BLDV-MCNC: 18 MG/DL (ref 8–23)
BUN/CREAT BLD: ABNORMAL (ref 9–20)
CALCIUM SERPL-MCNC: 9.5 MG/DL (ref 8.6–10.4)
CHLORIDE BLD-SCNC: 106 MMOL/L (ref 98–107)
CO2: 27 MMOL/L (ref 20–31)
CREAT SERPL-MCNC: 0.55 MG/DL (ref 0.5–0.9)
DIFFERENTIAL TYPE: NORMAL
EOSINOPHILS RELATIVE PERCENT: 1 % (ref 0–4)
GFR AFRICAN AMERICAN: >60 ML/MIN
GFR NON-AFRICAN AMERICAN: >60 ML/MIN
GFR SERPL CREATININE-BSD FRML MDRD: ABNORMAL ML/MIN/{1.73_M2}
GFR SERPL CREATININE-BSD FRML MDRD: ABNORMAL ML/MIN/{1.73_M2}
GLUCOSE BLD-MCNC: 97 MG/DL (ref 70–99)
HCT VFR BLD CALC: 36.6 % (ref 36–46)
HEMOGLOBIN: 12.3 G/DL (ref 12–16)
IMMATURE GRANULOCYTES: NORMAL %
LYMPHOCYTES # BLD: 35 % (ref 24–44)
MCH RBC QN AUTO: 28.9 PG (ref 26–34)
MCHC RBC AUTO-ENTMCNC: 33.6 G/DL (ref 31–37)
MCV RBC AUTO: 85.9 FL (ref 80–100)
MONOCYTES # BLD: 7 % (ref 1–7)
MORPHOLOGY: NORMAL
NRBC AUTOMATED: NORMAL PER 100 WBC
PDW BLD-RTO: 12.8 % (ref 11.5–14.9)
PLATELET # BLD: 192 K/UL (ref 150–450)
PLATELET ESTIMATE: NORMAL
PMV BLD AUTO: 7.2 FL (ref 6–12)
POTASSIUM SERPL-SCNC: 5.2 MMOL/L (ref 3.7–5.3)
RBC # BLD: 4.27 M/UL (ref 4–5.2)
RBC # BLD: NORMAL 10*6/UL
SEG NEUTROPHILS: 57 % (ref 36–66)
SEGMENTED NEUTROPHILS ABSOLUTE COUNT: 2.16 K/UL (ref 1.3–9.1)
SODIUM BLD-SCNC: 140 MMOL/L (ref 135–144)
WBC # BLD: 3.8 K/UL (ref 3.5–11)
WBC # BLD: NORMAL 10*3/UL

## 2020-11-05 PROCEDURE — 36415 COLL VENOUS BLD VENIPUNCTURE: CPT

## 2020-11-05 PROCEDURE — 85025 COMPLETE CBC W/AUTO DIFF WBC: CPT

## 2020-11-05 PROCEDURE — 80048 BASIC METABOLIC PNL TOTAL CA: CPT

## 2020-11-05 RX ORDER — M-VIT,TX,IRON,MINS/CALC/FOLIC 27MG-0.4MG
1 TABLET ORAL DAILY
COMMUNITY

## 2020-11-05 NOTE — H&P (VIEW-ONLY)
HISTORY and [facility] [address]       NAME:  Neo Mcnulty  MRN: [id number]   YOB: 1955   Date: 11/5/2020   Age: 63 y.o. Gender: female       COMPLAINT AND PRESENT HISTORY:     Neo Mcnulty is 63 y.o.,  female, undergoing preadmission testing for Carpal Tunnel Syndrome in right wrist. Patient had EMG/Nerve conduction test that showed moderate results. Patient will be having CARPAL TUNNEL RELEASE -Right . Patient has onset of 16 years ongoing. Pt states the sxs are progressively worse. Pt C/O of wrist pain that radiates to the hand and distal forearm. Patient also has weakness (dropping objects), numbness and tingling in the thumb index and middle fingers. Patient has more nocturnal discomfort. Patient describes her pain as aching and burning. Patient present with greater than hx of numbness and tingling. Pt has been using a brace and with little improvement of the symptoms. PAST MEDICAL HISTORY     Past Medical History:   Diagnosis Date    Arthritis     rt. hand, back.     Cancer ([address]     skin (basal cell)    History of renal calculi 11/14    Hyperglycemia 8/17/2018    Other chronic cystitis with hematuria     saw Dr. Lanie Khan Plantar fasciitis 11/14    right > left saw Dr. Ashwin Bond Right carpal tunnel syndrome     per Dr. Aisha Hooks, is wearing splint at [facility]    Wears glasses      SURGICAL HISTORY       Past Surgical History:   Procedure Laterality Date    CYST REMOVAL  10/15    posterior scalp    LIPOMA RESECTION Left 10/15    leg above knee    SKIN BIOPSY      SKIN CANCER EXCISION Left 06/2017    hand    TONSILLECTOMY  1963       FAMILY HISTORY       Family History   Problem Relation Age of Onset    Other Father         etoh    Cancer Father 62        lung cancer    Alcohol Abuse Father     COPD Father     Brain Cancer Father     Lung Cancer Father     Heart Disease Mother     Coronary Art Dis Brother        SOCIAL HISTORY Social History     Socioeconomic History    Marital status:      Spouse name: None    Number of children: None    Years of education: None    Highest education level: None   Occupational History    None   Social Needs    Financial resource strain: None    Food insecurity     Worry: None     Inability: None    Transportation needs     Medical: None     Non-medical: None   Tobacco Use    Smoking status: Never Smoker    Smokeless tobacco: Never Used   Substance and Sexual Activity    Alcohol use: No    Drug use: No    Sexual activity: Yes     Partners: Male   Lifestyle    Physical activity     Days per week: None     Minutes per session: None    Stress: None   Relationships    Social connections     Talks on phone: None     Gets together: None     Attends Druze service: None     Active member of club or organization: None     Attends meetings of clubs or organizations: None     Relationship status: None    Intimate partner violence     Fear of current or ex partner: None     Emotionally abused: None     Physically abused: None     Forced sexual activity: None   Other Topics Concern    None   Social History Narrative    None           REVIEW OF SYSTEMS      No Known Allergies    Current Outpatient Medications on File Prior to Encounter   Medication Sig Dispense Refill    Multiple Vitamins-Minerals (THERAPEUTIC MULTIVITAMIN-MINERALS) tablet Take 1 tablet by mouth daily       No current facility-administered medications on file prior to encounter. General health:  Fairly good. No fever or chills. Skin:  No itching, redness or rash. HEENT:  No headache, epistaxis or sore throat. Neck:  No pain, stiffness or masses. Cardiovascular/Respiratory system:  No chest pain, palpitation or shortness of breath.                          Gastrointestinal tract: No abdominal pain, Dysphagia, nausea, vomiting, diarrhea or

## 2020-11-05 NOTE — H&P
HISTORY and [facility] [address]       NAME:  Porter Matamoros  MRN: [id number]   YOB: 1955   Date: 11/5/2020   Age: 63 y.o. Gender: female       COMPLAINT AND PRESENT HISTORY:     Porter Matamoros is 63 y.o.,  female, undergoing preadmission testing for Carpal Tunnel Syndrome in right wrist. Patient had EMG/Nerve conduction test that showed moderate results. Patient will be having CARPAL TUNNEL RELEASE -Right . Patient has onset of 16 years ongoing. Pt states the sxs are progressively worse. Pt C/O of wrist pain that radiates to the hand and distal forearm. Patient also has weakness (dropping objects), numbness and tingling in the thumb index and middle fingers. Patient has more nocturnal discomfort. Patient describes her pain as aching and burning. Patient present with greater than hx of numbness and tingling. Pt has been using a brace and with little improvement of the symptoms. PAST MEDICAL HISTORY     Past Medical History:   Diagnosis Date    Arthritis     rt. hand, back.     Cancer ([facility] [address]     skin (basal cell)    History of renal calculi 11/14    Hyperglycemia 8/17/2018    Other chronic cystitis with hematuria     saw Dr. Beltran Generous Plantar fasciitis 11/14    right > left saw Dr. Boo Mena Right carpal tunnel syndrome     per Dr. Vy Raines, is wearing splint [facility]    Wears glasses      SURGICAL HISTORY       Past Surgical History:   Procedure Laterality Date    CYST REMOVAL  10/15    posterior scalp    LIPOMA RESECTION Left 10/15    leg above knee    SKIN BIOPSY      SKIN CANCER EXCISION Left 06/2017    hand    TONSILLECTOMY  1963       FAMILY HISTORY       Family History   Problem Relation Age of Onset    Other Father         etoh    Cancer Father 62        lung cancer    Alcohol Abuse Father     COPD Father     Brain Cancer Father     Lung Cancer Father     Heart Disease Mother     Coronary Art Dis Brother        SOCIAL HISTORY Social History     Socioeconomic History    Marital status:      Spouse name: None    Number of children: None    Years of education: None    Highest education level: None   Occupational History    None   Social Needs    Financial resource strain: None    Food insecurity     Worry: None     Inability: None    Transportation needs     Medical: None     Non-medical: None   Tobacco Use    Smoking status: Never Smoker    Smokeless tobacco: Never Used   Substance and Sexual Activity    Alcohol use: No    Drug use: No    Sexual activity: Yes     Partners: Male   Lifestyle    Physical activity     Days per week: None     Minutes per session: None    Stress: None   Relationships    Social connections     Talks on phone: None     Gets together: None     Attends Episcopalian service: None     Active member of club or organization: None     Attends meetings of clubs or organizations: None     Relationship status: None    Intimate partner violence     Fear of current or ex partner: None     Emotionally abused: None     Physically abused: None     Forced sexual activity: None   Other Topics Concern    None   Social History Narrative    None           REVIEW OF SYSTEMS      No Known Allergies    Current Outpatient Medications on File Prior to Encounter   Medication Sig Dispense Refill    Multiple Vitamins-Minerals (THERAPEUTIC MULTIVITAMIN-MINERALS) tablet Take 1 tablet by mouth daily       No current facility-administered medications on file prior to encounter. General health:  Fairly good. No fever or chills. Skin:  No itching, redness or rash. HEENT:  No headache, epistaxis or sore throat. Neck:  No pain, stiffness or masses. Cardiovascular/Respiratory system:  No chest pain, palpitation or shortness of breath.                          Gastrointestinal tract: No abdominal pain, Dysphagia, nausea, vomiting, diarrhea or constipation. Genitourinary:  No burning on micturition. No hesitancy, urgency, frequency or discoloration of urine. Locomotor: See HPI. Neuropsychiatric:  No referable complaints. GENERAL PHYSICAL EXAM:     Vitals: /79   Pulse 75   Temp 97 °F (36.1 °C) (Temporal)   Resp 12   Ht 5' 7\" (1.702 m)   Wt 191 lb 11.2 oz (87 kg)   LMP 11/12/2014   SpO2 99%   BMI 30.02 kg/m²  Body mass index is 30.02 kg/m². GENERAL APPEARANCE:   Sukhwinder England is 63 y.o.,  female, moderately obese, nourished, conscious, alert. Does not appear to be distress or pain at this time. SKIN:  Warm, dry, no cyanosis or jaundice. HEAD:  Normocephalic, atraumatic, no swelling or tenderness. EYES:  Pupils equal, reactive to light. EARS:  No discharge, no marked hearing loss. NOSE:  No rhinorrhea, epistaxis or septal deformity. THROAT:  Not congested. No ulceration bleeding or discharge. NECK:  No stiffness, trachea central.                  CHEST:  Symmetrical and equal on expansion. HEART:  RRR S1 > S2. No audible murmurs or gallops. LUNGS:  Equal on expansion, normal breath sounds. No adventitious sounds. ABDOMEN:  Obese. Soft on palpation. No localized tenderness. No guarding or rigidity. No palpable hepatosplenomegaly. LYMPHATICS:  No palpable cervical lymphadenopathy. LOCOMOTOR, BACK AND SPINE:  No tenderness or deformities. EXTREMITIES:  Symmetrical, no pretibial edema. Janaes sign negative or no calf tenderness. No discoloration or ulcerations. Positive Phalens Test and Tinels Sign on the right wrist.    NEUROLOGIC:  The patient is conscious, alert, oriented,Cranial nerve II-XII intact, taste and smell were not examined. No apparent focal sensory or motor deficits. PROVISIONAL DIAGNOSES / SURGERY:      CARPAL TUNNEL SYNDROME    CARPAL TUNNEL RELEASE  Right     Patient Active Problem List    Diagnosis Date Noted    Arthritis 02/18/2019    Chronic eczematous otitis externa of left ear 12/07/2017    Chronic midline low back pain without sciatica 12/07/2017    Bilateral plantar fasciitis 11/24/2015    History of renal calculi     Other chronic cystitis with hematuria            GRAZYNA SORIANO, APRN - CNP on 11/5/2020 at 9:26 AM

## 2020-11-13 ENCOUNTER — HOSPITAL ENCOUNTER (OUTPATIENT)
Dept: PREADMISSION TESTING | Age: 65
Discharge: HOME | End: 2020-11-17
Payer: COMMERCIAL

## 2020-11-13 PROCEDURE — U0003 INFECTIOUS AGENT DETECTION BY NUCLEIC ACID (DNA OR RNA); SEVERE ACUTE RESPIRATORY SYNDROME CORONAVIRUS 2 (SARS-COV-2) (CORONAVIRUS DISEASE [COVID-19]), AMPLIFIED PROBE TECHNIQUE, MAKING USE OF HIGH THROUGHPUT TECHNOLOGIES AS DESCRIBED BY CMS-2020-01-R: HCPCS

## 2020-11-15 LAB — SARS-COV-2, NAA: NOT DETECTED

## 2020-11-16 ENCOUNTER — ANESTHESIA EVENT (OUTPATIENT)
Dept: OPERATING ROOM | Age: 65
End: 2020-11-16
Payer: COMMERCIAL

## 2020-11-17 ENCOUNTER — ANESTHESIA (OUTPATIENT)
Dept: OPERATING ROOM | Age: 65
End: 2020-11-17
Payer: COMMERCIAL

## 2020-11-17 ENCOUNTER — HOSPITAL ENCOUNTER (OUTPATIENT)
Age: 65
Setting detail: OUTPATIENT SURGERY
Discharge: HOME | End: 2020-11-17
Attending: ORTHOPAEDIC SURGERY | Admitting: ORTHOPAEDIC SURGERY
Payer: COMMERCIAL

## 2020-11-17 VITALS
SYSTOLIC BLOOD PRESSURE: 156 MMHG | HEIGHT: 67 IN | OXYGEN SATURATION: 60 % | DIASTOLIC BLOOD PRESSURE: 70 MMHG | WEIGHT: 191.69 LBS | HEART RATE: 60 BPM | RESPIRATION RATE: 16 BRPM | TEMPERATURE: 97.5 F | BODY MASS INDEX: 30.09 KG/M2

## 2020-11-17 VITALS — TEMPERATURE: 97.5 F | DIASTOLIC BLOOD PRESSURE: 55 MMHG | SYSTOLIC BLOOD PRESSURE: 118 MMHG | OXYGEN SATURATION: 100 %

## 2020-11-17 PROCEDURE — 7100000001 HC PACU RECOVERY - ADDTL 15 MIN: Performed by: ORTHOPAEDIC SURGERY

## 2020-11-17 PROCEDURE — 2500000003 HC RX 250 WO HCPCS: Performed by: NURSE ANESTHETIST, CERTIFIED REGISTERED

## 2020-11-17 PROCEDURE — 7100000011 HC PHASE II RECOVERY - ADDTL 15 MIN: Performed by: ORTHOPAEDIC SURGERY

## 2020-11-17 PROCEDURE — 6360000002 HC RX W HCPCS: Performed by: NURSE ANESTHETIST, CERTIFIED REGISTERED

## 2020-11-17 PROCEDURE — 7100000010 HC PHASE II RECOVERY - FIRST 15 MIN: Performed by: ORTHOPAEDIC SURGERY

## 2020-11-17 PROCEDURE — 6360000002 HC RX W HCPCS: Performed by: ORTHOPAEDIC SURGERY

## 2020-11-17 PROCEDURE — 3600000002 HC SURGERY LEVEL 2 BASE: Performed by: ORTHOPAEDIC SURGERY

## 2020-11-17 PROCEDURE — 2580000003 HC RX 258: Performed by: ANESTHESIOLOGY

## 2020-11-17 PROCEDURE — 7100000030 HC ASPR PHASE II RECOVERY - FIRST 15 MIN: Performed by: ORTHOPAEDIC SURGERY

## 2020-11-17 PROCEDURE — 3600000012 HC SURGERY LEVEL 2 ADDTL 15MIN: Performed by: ORTHOPAEDIC SURGERY

## 2020-11-17 PROCEDURE — 7100000031 HC ASPR PHASE II RECOVERY - ADDTL 15 MIN: Performed by: ORTHOPAEDIC SURGERY

## 2020-11-17 PROCEDURE — 2709999900 HC NON-CHARGEABLE SUPPLY: Performed by: ORTHOPAEDIC SURGERY

## 2020-11-17 PROCEDURE — 3700000001 HC ADD 15 MINUTES (ANESTHESIA): Performed by: ORTHOPAEDIC SURGERY

## 2020-11-17 PROCEDURE — 3700000000 HC ANESTHESIA ATTENDED CARE: Performed by: ORTHOPAEDIC SURGERY

## 2020-11-17 PROCEDURE — 7100000000 HC PACU RECOVERY - FIRST 15 MIN: Performed by: ORTHOPAEDIC SURGERY

## 2020-11-17 PROCEDURE — 64721 CARPAL TUNNEL SURGERY: CPT | Performed by: ORTHOPAEDIC SURGERY

## 2020-11-17 RX ORDER — DEXAMETHASONE SODIUM PHOSPHATE 4 MG/ML
INJECTION, SOLUTION INTRA-ARTICULAR; INTRALESIONAL; INTRAMUSCULAR; INTRAVENOUS; SOFT TISSUE PRN
Status: DISCONTINUED | OUTPATIENT
Start: 2020-11-17 | End: 2020-11-17 | Stop reason: SDUPTHER

## 2020-11-17 RX ORDER — FENTANYL CITRATE 50 UG/ML
25 INJECTION, SOLUTION INTRAMUSCULAR; INTRAVENOUS EVERY 5 MIN PRN
Status: DISCONTINUED | OUTPATIENT
Start: 2020-11-17 | End: 2020-11-17 | Stop reason: HOSPADM

## 2020-11-17 RX ORDER — PROMETHAZINE HYDROCHLORIDE 25 MG/ML
6.25 INJECTION, SOLUTION INTRAMUSCULAR; INTRAVENOUS
Status: DISCONTINUED | OUTPATIENT
Start: 2020-11-17 | End: 2020-11-17 | Stop reason: HOSPADM

## 2020-11-17 RX ORDER — ONDANSETRON 2 MG/ML
INJECTION INTRAMUSCULAR; INTRAVENOUS PRN
Status: DISCONTINUED | OUTPATIENT
Start: 2020-11-17 | End: 2020-11-17 | Stop reason: SDUPTHER

## 2020-11-17 RX ORDER — MIDAZOLAM HYDROCHLORIDE 1 MG/ML
INJECTION INTRAMUSCULAR; INTRAVENOUS PRN
Status: DISCONTINUED | OUTPATIENT
Start: 2020-11-17 | End: 2020-11-17 | Stop reason: SDUPTHER

## 2020-11-17 RX ORDER — HYDROCODONE BITARTRATE AND ACETAMINOPHEN 5; 325 MG/1; MG/1
1 TABLET ORAL EVERY 6 HOURS PRN
Qty: 20 TABLET | Refills: 0 | Status: SHIPPED | OUTPATIENT
Start: 2020-11-17 | End: 2020-11-22

## 2020-11-17 RX ORDER — SODIUM CHLORIDE, SODIUM LACTATE, POTASSIUM CHLORIDE, CALCIUM CHLORIDE 600; 310; 30; 20 MG/100ML; MG/100ML; MG/100ML; MG/100ML
INJECTION, SOLUTION INTRAVENOUS CONTINUOUS
Status: DISCONTINUED | OUTPATIENT
Start: 2020-11-17 | End: 2020-11-17 | Stop reason: HOSPADM

## 2020-11-17 RX ORDER — LABETALOL HYDROCHLORIDE 5 MG/ML
5 INJECTION, SOLUTION INTRAVENOUS EVERY 10 MIN PRN
Status: DISCONTINUED | OUTPATIENT
Start: 2020-11-17 | End: 2020-11-17 | Stop reason: HOSPADM

## 2020-11-17 RX ORDER — LIDOCAINE HYDROCHLORIDE 10 MG/ML
INJECTION, SOLUTION EPIDURAL; INFILTRATION; INTRACAUDAL; PERINEURAL PRN
Status: DISCONTINUED | OUTPATIENT
Start: 2020-11-17 | End: 2020-11-17 | Stop reason: SDUPTHER

## 2020-11-17 RX ORDER — 0.9 % SODIUM CHLORIDE 0.9 %
500 INTRAVENOUS SOLUTION INTRAVENOUS
Status: DISCONTINUED | OUTPATIENT
Start: 2020-11-17 | End: 2020-11-17 | Stop reason: HOSPADM

## 2020-11-17 RX ORDER — DIPHENHYDRAMINE HYDROCHLORIDE 50 MG/ML
12.5 INJECTION INTRAMUSCULAR; INTRAVENOUS
Status: DISCONTINUED | OUTPATIENT
Start: 2020-11-17 | End: 2020-11-17 | Stop reason: HOSPADM

## 2020-11-17 RX ORDER — OXYCODONE HYDROCHLORIDE AND ACETAMINOPHEN 5; 325 MG/1; MG/1
1 TABLET ORAL
Status: DISCONTINUED | OUTPATIENT
Start: 2020-11-17 | End: 2020-11-17 | Stop reason: HOSPADM

## 2020-11-17 RX ORDER — ROPIVACAINE HYDROCHLORIDE 5 MG/ML
INJECTION, SOLUTION EPIDURAL; INFILTRATION; PERINEURAL PRN
Status: DISCONTINUED | OUTPATIENT
Start: 2020-11-17 | End: 2020-11-17 | Stop reason: ALTCHOICE

## 2020-11-17 RX ORDER — METOCLOPRAMIDE HYDROCHLORIDE 5 MG/ML
10 INJECTION INTRAMUSCULAR; INTRAVENOUS
Status: DISCONTINUED | OUTPATIENT
Start: 2020-11-17 | End: 2020-11-17 | Stop reason: HOSPADM

## 2020-11-17 RX ORDER — SODIUM CHLORIDE 0.9 % (FLUSH) 0.9 %
10 SYRINGE (ML) INJECTION PRN
Status: DISCONTINUED | OUTPATIENT
Start: 2020-11-17 | End: 2020-11-17 | Stop reason: HOSPADM

## 2020-11-17 RX ORDER — LIDOCAINE HYDROCHLORIDE 10 MG/ML
1 INJECTION, SOLUTION EPIDURAL; INFILTRATION; INTRACAUDAL; PERINEURAL
Status: DISCONTINUED | OUTPATIENT
Start: 2020-11-17 | End: 2020-11-17 | Stop reason: HOSPADM

## 2020-11-17 RX ORDER — SODIUM CHLORIDE 0.9 % (FLUSH) 0.9 %
10 SYRINGE (ML) INJECTION EVERY 12 HOURS SCHEDULED
Status: DISCONTINUED | OUTPATIENT
Start: 2020-11-17 | End: 2020-11-17 | Stop reason: HOSPADM

## 2020-11-17 RX ORDER — PROPOFOL 10 MG/ML
INJECTION, EMULSION INTRAVENOUS PRN
Status: DISCONTINUED | OUTPATIENT
Start: 2020-11-17 | End: 2020-11-17 | Stop reason: SDUPTHER

## 2020-11-17 RX ORDER — HYDRALAZINE HYDROCHLORIDE 20 MG/ML
5 INJECTION INTRAMUSCULAR; INTRAVENOUS EVERY 10 MIN PRN
Status: DISCONTINUED | OUTPATIENT
Start: 2020-11-17 | End: 2020-11-17 | Stop reason: HOSPADM

## 2020-11-17 RX ADMIN — PROPOFOL 200 MG: 10 INJECTION, EMULSION INTRAVENOUS at 11:37

## 2020-11-17 RX ADMIN — DEXAMETHASONE SODIUM PHOSPHATE 4 MG: 4 INJECTION, SOLUTION INTRA-ARTICULAR; INTRALESIONAL; INTRAMUSCULAR; INTRAVENOUS; SOFT TISSUE at 11:45

## 2020-11-17 RX ADMIN — SODIUM CHLORIDE, POTASSIUM CHLORIDE, SODIUM LACTATE AND CALCIUM CHLORIDE: 600; 310; 30; 20 INJECTION, SOLUTION INTRAVENOUS at 09:54

## 2020-11-17 RX ADMIN — LIDOCAINE HYDROCHLORIDE 50 MG: 10 INJECTION, SOLUTION EPIDURAL; INFILTRATION; INTRACAUDAL; PERINEURAL at 11:37

## 2020-11-17 RX ADMIN — SODIUM CHLORIDE, POTASSIUM CHLORIDE, SODIUM LACTATE AND CALCIUM CHLORIDE: 600; 310; 30; 20 INJECTION, SOLUTION INTRAVENOUS at 11:46

## 2020-11-17 RX ADMIN — MIDAZOLAM 2 MG: 1 INJECTION INTRAMUSCULAR; INTRAVENOUS at 11:32

## 2020-11-17 RX ADMIN — ONDANSETRON 4 MG: 2 INJECTION INTRAMUSCULAR; INTRAVENOUS at 11:45

## 2020-11-17 ASSESSMENT — PULMONARY FUNCTION TESTS
PIF_VALUE: 11
PIF_VALUE: 10
PIF_VALUE: 10
PIF_VALUE: 1
PIF_VALUE: 10
PIF_VALUE: 11
PIF_VALUE: 10
PIF_VALUE: 10
PIF_VALUE: 0
PIF_VALUE: 1
PIF_VALUE: 11
PIF_VALUE: 10
PIF_VALUE: 1
PIF_VALUE: 0
PIF_VALUE: 11
PIF_VALUE: 6
PIF_VALUE: 0
PIF_VALUE: 11
PIF_VALUE: 11
PIF_VALUE: 10
PIF_VALUE: 11
PIF_VALUE: 0
PIF_VALUE: 11
PIF_VALUE: 10
PIF_VALUE: 0
PIF_VALUE: 11
PIF_VALUE: 10
PIF_VALUE: 10
PIF_VALUE: 11
PIF_VALUE: 10

## 2020-11-17 ASSESSMENT — PAIN - FUNCTIONAL ASSESSMENT: PAIN_FUNCTIONAL_ASSESSMENT: 0-10

## 2020-11-17 ASSESSMENT — PAIN SCALES - GENERAL
PAINLEVEL_OUTOF10: 0

## 2020-11-17 NOTE — ANESTHESIA PRE PROCEDURE
Department of Anesthesiology  Preprocedure Note       Name:  Ulices Warren   Age:  63 y.o.  :  1955                                          MRN:  [id number]         Date:  2020      Surgeon: Ashley Flores):  Bishop Haines MD    Procedure: Procedure(s):  CARPAL TUNNEL RELEASE    Medications prior to admission:   Prior to Admission medications    Medication Sig Start Date End Date Taking? Authorizing Provider   HYDROcodone-acetaminophen (NORCO) 5-325 MG per tablet Take 1 tablet by mouth every 6 hours as needed for Pain for up to 5 days. Intended supply: 5 days. Take lowest dose possible to manage pain 20 Yes Bishop Haines MD   Multiple Vitamins-Minerals (THERAPEUTIC MULTIVITAMIN-MINERALS) tablet Take 1 tablet by mouth daily    Historical Provider, MD       Current medications:    Current Facility-Administered Medications   Medication Dose Route Frequency Provider Last Rate Last Dose    lactated ringers infusion   Intravenous Continuous Gillian House  mL/hr at 20 0954      sodium chloride flush 0.9 % injection 10 mL  10 mL Intravenous 2 times per day Jennifer Andrew MD        sodium chloride flush 0.9 % injection 10 mL  10 mL Intravenous PRN Gillian House MD        lidocaine PF 1 % injection 1 mL  1 mL Intradermal Once PRN Jennifer Andrew MD           Allergies:  No Known Allergies    Problem List:    Patient Active Problem List   Diagnosis Code    History of renal calculi Z87.442    Other chronic cystitis with hematuria N30.21    Bilateral plantar fasciitis M72.2    Chronic eczematous otitis externa of left ear H60.8X2    Chronic midline low back pain without sciatica M54.5, G89.29    Arthritis M19.90       Past Medical History:        Diagnosis Date    Arthritis     rt. hand, back.     Cancer ([facility] [address]     skin (basal cell)    History of renal calculi     Hyperglycemia 2018    Other chronic cystitis with hematuria     saw Dr. Obdulia Maher 02/06/2020    AST 18 02/06/2020    ALT 17 02/06/2020       POC Tests: No results for input(s): POCGLU, POCNA, POCK, POCCL, POCBUN, POCHEMO, POCHCT in the last 72 hours. Coags: No results found for: PROTIME, INR, APTT    HCG (If Applicable): No results found for: PREGTESTUR, PREGSERUM, HCG, HCGQUANT     ABGs: No results found for: PHART, PO2ART, EMD9OJR, QQE1FMR, BEART, J8NCLEMA     Type & Screen (If Applicable):  No results found for: LABABO, LABRH    Drug/Infectious Status (If Applicable):  Lab Results   Component Value Date    HEPCAB NONREACTIVE 09/14/2017       COVID-19 Screening (If Applicable):   Lab Results   Component Value Date    COVID19 Not Detected 11/13/2020         Anesthesia Evaluation  Patient summary reviewed and Nursing notes reviewed no history of anesthetic complications:   Airway: Mallampati: II  TM distance: >3 FB   Neck ROM: full  Mouth opening: > = 3 FB Dental:    (+) other      Pulmonary:Negative Pulmonary ROS and normal exam  breath sounds clear to auscultation                             Cardiovascular:Negative CV ROS  Exercise tolerance: good (>4 METS),           Rhythm: regular  Rate: normal                    Neuro/Psych:   (+) neuromuscular disease (chronic back pain):,             GI/Hepatic/Renal:   (+) renal disease: kidney stones,           Endo/Other:    (+) : arthritis: OA., malignancy/cancer (skin basal cell). Abdominal:           Vascular:                                        Anesthesia Plan      general     ASA 2       Induction: intravenous. MIPS: Postoperative opioids intended and Prophylactic antiemetics administered. Anesthetic plan and risks discussed with patient. Plan discussed with CRNA.                   Brendan Gama MD   11/17/2020

## 2020-11-17 NOTE — ANESTHESIA POSTPROCEDURE EVALUATION
Department of Anesthesiology  Postprocedure Note    Patient: Kayode Valdivia  MRN: [id number]  YOB: 1955  Date of evaluation: 11/17/2020  Time:  12:56 PM     Procedure Summary     Date:  11/17/20 Room / Location:  [address] [place] [address] / [address] [facility]     Anesthesia Start:  1117 Anesthesia Stop:  1214    Procedure:  CARPAL TUNNEL RELEASE (Right Hand) Diagnosis:  (RIGHT CARPAL TUNNEL)    Surgeon:  Helder Okeefe MD Responsible Provider:  Bridger Lopez MD    Anesthesia Type:  general ASA Status:  2          Anesthesia Type: general    Jia Phase I: Jia Score: 10    Jia Phase II:      Last vitals: Reviewed and per EMR flowsheets.        Anesthesia Post Evaluation    Comments: POST- ANESTHESIA EVALUATION       Pt Name: Kayode Valdivia  MRN: [id number]  YOB: 1955  Date of evaluation: 11/17/2020  Time:  12:56 PM      BP (!) 151/84   Pulse 62   Temp 96.8 °F (36 °C)   Resp 10   Ht 5' 7\" (1.702 m)   Wt 191 lb 11 oz (86.9 kg)   LMP 11/12/2014   SpO2 99%   BMI 30.02 kg/m²      Consciousness Level  Awake  Cardiopulmonary Status  Stable  Pain Adequately Treated YES  Nausea / Vomiting  NO  Adequate Hydration  YES  Anesthesia Related Complications NONE      Electronically signed by Bridger Lopez MD on 11/17/2020 at 12:56 PM

## 2020-11-17 NOTE — OP NOTE
Operative Note      Patient: Ewa Zelaya  YOB: 1955  MRN: [id number]    Date of Procedure: 11/17/2020    Pre-Op Diagnosis: RIGHT CARPAL TUNNEL    Post-Op Diagnosis: Same       Procedure(s):  CARPAL TUNNEL RELEASE    Surgeon(s):  Swapna Trammell MD    Assistant:   Resident: Ana Tobar DO    Anesthesia: General    Estimated Blood Loss (mL): Minimal    Complications: None    Specimens:   * No specimens in log *    Implants:  * No implants in log *      Drains: * No LDAs found *    Findings: Carpal tunnel syndrome right    Detailed Description of Procedure: This patient has presented with a history of carpal tunnel symptoms of the right  hand including pain, numbness, weakness, and nocturnal paraesthesias. The patient has EMG/NCV findings consistent with carpal tunnel syndrome. The patient has failed a trail of nocturnal splinting and, therefore, it was advised the patient would benefit from carpal tunnel release. Consent was obtained with a  good comprehension of all risks and benefits. The patient was taken to the operative suite and the above anesthesia was administered. A tourniquet was applied to the operative arm which was then prepped and draped in the usual sterile fashion. Time-out was called to verify the appropriate side for surgery. The arm was exsanguinated and the tourniquet inflated to 250mmHg. An incision was made at the junction of Michel's line and the radial side of the ring finger. It was extended proximally for 1.5cm and curved slightly ulnarly. The Sub-Q and the palmar fascia was spread with a mosquito hemostat and retractors positioned. The transverse carpal ligament was identified puncture with the hemostat, which was used to protect the underlying structures of the carpal tunnel while the TCL was divided with a #15 blade proximally and distally.  The wrist was hyperextended to facilitate complete release of the TCL into the deep volar forearm fascia with blunt Metzenbaum scissor. Complete distal release was carried out with complete release verified. , the wound was irrigated and then injected with 6cc of 0.5% ropivacaine. The wound was then closed with4-0 nylon sutures in a vertical mattress pattern. A sterile bulky bandage was applied and wrapped with a 2inch ace bandage. The tourniquet was deflated at less than 20 minutes. The patient was awaken and taken to the PACU on good condition.       Electronically signed by Leilani Torres MD on 11/17/2020 at 11:59 AM

## 2020-11-17 NOTE — INTERVAL H&P NOTE
Update History & Physical    The patient's History and Physical of November 5, 2020 was reviewed with the patient and I examined the patient. There was no change. The surgical site was confirmed by the patient and me. Patient has been NPO since midnight. No blood thinners in the past 7 days. Plan: The risks, benefits, expected outcome, and alternative to the recommended procedure have been discussed with the patient. Patient understands and wants to proceed with the procedure.      Electronically signed by KWAME Elias CNP on 11/17/2020 at 9:16 AM

## 2020-11-30 ENCOUNTER — OFFICE VISIT (OUTPATIENT)
Dept: ORTHOPEDIC SURGERY | Age: 65
End: 2020-11-30

## 2020-11-30 PROCEDURE — 99024 POSTOP FOLLOW-UP VISIT: CPT | Performed by: ORTHOPAEDIC SURGERY

## 2020-11-30 NOTE — PROGRESS NOTES
Patient returns today status post right carpal tunnel release on 11/17/2020. She states that the numbness and tingling is significant improved    Examination of the patient's right hand notes her incisions pristine. Steri-Strips applied. She is moving her fingers well sensation intact. Impression  Status post right carpal tunnel release    Plan  Patient given home PT exercise program. No heavy lifting. Carefull when pushing from chair. Discussed with patient about not immersing hand in sink or tub. Showers are okay. Retain steri-strips until fall off. Return to work as stated in documentation. RTO PRN.

## 2020-12-08 ENCOUNTER — TELEPHONE (OUTPATIENT)
Dept: FAMILY MEDICINE CLINIC | Age: 65
End: 2020-12-08

## 2021-01-28 ENCOUNTER — OFFICE VISIT (OUTPATIENT)
Dept: INTERNAL MEDICINE CLINIC | Age: 66
End: 2021-01-28
Payer: COMMERCIAL

## 2021-01-28 VITALS
WEIGHT: 191 LBS | BODY MASS INDEX: 29.98 KG/M2 | HEIGHT: 67 IN | DIASTOLIC BLOOD PRESSURE: 78 MMHG | TEMPERATURE: 97.9 F | HEART RATE: 68 BPM | SYSTOLIC BLOOD PRESSURE: 130 MMHG | RESPIRATION RATE: 14 BRPM

## 2021-01-28 DIAGNOSIS — H93.13 TINNITUS OF BOTH EARS: Primary | ICD-10-CM

## 2021-01-28 DIAGNOSIS — Z00.00 HEALTH CARE MAINTENANCE: ICD-10-CM

## 2021-01-28 DIAGNOSIS — Z76.89 ENCOUNTER TO ESTABLISH CARE WITH NEW DOCTOR: ICD-10-CM

## 2021-01-28 PROCEDURE — 99212 OFFICE O/P EST SF 10 MIN: CPT | Performed by: INTERNAL MEDICINE

## 2021-01-28 ASSESSMENT — PATIENT HEALTH QUESTIONNAIRE - PHQ9
SUM OF ALL RESPONSES TO PHQ QUESTIONS 1-9: 0
SUM OF ALL RESPONSES TO PHQ9 QUESTIONS 1 & 2: 0
SUM OF ALL RESPONSES TO PHQ QUESTIONS 1-9: 0
2. FEELING DOWN, DEPRESSED OR HOPELESS: 0

## 2021-01-28 NOTE — PROGRESS NOTES
Visit Information    Have you changed or started any medications since your last visit including any over-the-counter medicines, vitamins, or herbal medicines? no   Are you having any side effects from any of your medications? -  no  Have you stopped taking any of your medications? Is so, why? -  no    Have you seen any other physician or provider since your last visit? Yes - Records Obtained  Have you had any other diagnostic tests since your last visit? Yes - Records Obtained  Have you been seen in the emergency room and/or had an admission to a hospital since we last saw you? No  Have you had your routine dental cleaning in the past 6 months? yes     Have you activated your [employer] account? If not, what are your barriers?  No     Patient Care Team:  Anjelica Ware MD as PCP - General (Internal Medicine)  Evelio Uriostegui MD as PCP - [facility] Provider  Isabel Salmeron MD as Consulting Physician (Dermatology)  Merline Panning, MD as Consulting Physician (Urology)  Mia Nettles as Consulting Physician (Colon and Rectal Surgery)  Vinayak Sanchez MD as Consulting Physician (Orthopedic Surgery)  Yissel Schmitz MD as Consulting Physician (Dermatology)    Medical History Review  Past Medical, Family, and Social History reviewed and does contribute to the patient presenting condition    Health Maintenance   Topic Date Due    Flu vaccine (1) 09/01/2020    HIV screen  07/19/2021 (Originally 6/14/1970)    Breast cancer screen  09/10/2022    Cervical cancer screen  09/13/2022    Lipid screen  02/06/2025    Colon cancer screen colonoscopy  11/16/2025    DTaP/Tdap/Td vaccine (3 - Td) 08/21/2028    Shingles Vaccine  Completed    Hepatitis C screen  Completed    Hepatitis A vaccine  Aged Out    Hepatitis B vaccine  Aged Out    Hib vaccine  Aged Out    Meningococcal (ACWY) vaccine  Aged Out    Pneumococcal 0-64 years Vaccine  Aged Out     Chief Complaint   Patient presents with   [place] Years of education: Not on file    Highest education level: Not on file   Occupational History    Not on file   Social Needs    Financial resource strain: Not on file    Food insecurity     Worry: Not on file     Inability: Not on file    Transportation needs     Medical: Not on file     Non-medical: Not on file   Tobacco Use    Smoking status: Never Smoker    Smokeless tobacco: Never Used   Substance and Sexual Activity    Alcohol use: No    Drug use: No    Sexual activity: Yes     Partners: Male   Lifestyle    Physical activity     Days per week: Not on file     Minutes per session: Not on file    Stress: Not on file   Relationships    Social connections     Talks on phone: Not on file     Gets together: Not on file     Attends Restorationist service: Not on file     Active member of club or organization: Not on file     Attends meetings of clubs or organizations: Not on file     Relationship status: Not on file    Intimate partner violence     Fear of current or ex partner: Not on file     Emotionally abused: Not on file     Physically abused: Not on file     Forced sexual activity: Not on file   Other Topics Concern    Not on file   Social History Narrative    Not on file       CURRENT MEDICATIONS:  Current Outpatient Medications   Medication Sig Dispense Refill    Multiple Vitamins-Minerals (THERAPEUTIC MULTIVITAMIN-MINERALS) tablet Take 1 tablet by mouth daily       No current facility-administered medications for this visit. OBJECTIVE:  Vitals:    01/28/21 0949   BP: 130/78   Pulse: 68   Resp: 14   Temp: 97.9 °F (36.6 °C)         General exam (except above):  Constitutional - well appearing, alert, in no acute distress  Eyes: Anicteric sclera. Pupils are equally round and reactive to light. Extraocular movements are intact. Skin: Skin color, texture, turgor normal  Respiratory - Lungs clear to auscultation. No wheezing, rhonchi, rales  Cardiovascular - RRR. S1S2 present.  No leg

## 2021-01-29 ENCOUNTER — TELEPHONE (OUTPATIENT)
Dept: ORTHOPEDIC SURGERY | Age: 66
End: 2021-01-29

## 2021-01-29 DIAGNOSIS — G56.01 CARPAL TUNNEL SYNDROME ON RIGHT: Primary | ICD-10-CM

## 2021-01-29 NOTE — TELEPHONE ENCOUNTER
Surgery 11/17/20 Carpel Tunnel Release - Right    Patient called to say that her right hand and arm have been going numb at night and upon wakening her hand is very stiff. She is wondering if this is normal but concerned since it is happening at least once or twice a night.

## 2021-02-07 ENCOUNTER — PATIENT MESSAGE (OUTPATIENT)
Dept: ORTHOPEDIC SURGERY | Age: 66
End: 2021-02-07

## 2021-02-08 NOTE — TELEPHONE ENCOUNTER
My Chart Message from Patient: Dated 2/7/21 at 3:54pm    Re: carpel tunnel surgery-right hand 11/2020  On 1/29/2021, it was suggested that I have PT or OT. I found out that [facility], on [address] [place] [address], has someone  who works on hands. Please send the order sent there. If  [facility] has any reservations about this facility, please let me know. I am open to other suggestions. Once or twice during the night, my right hand goes numb from my finger tips to my wrist,  My hand feels large, my fingers stiff. It takes 5+ minutes to get the circulation back so I can go back to sleep. Due to the limitation of characters allowed, I made this brief. I have a typed note with more details that I am happy to provide if needed.

## 2021-02-09 NOTE — TELEPHONE ENCOUNTER
Called over to [facility] and they want the order to PT order not OT. Put order in and will send over to [facility].

## 2021-03-17 ENCOUNTER — OFFICE VISIT (OUTPATIENT)
Dept: INTERNAL MEDICINE CLINIC | Age: 66
End: 2021-03-17
Payer: COMMERCIAL

## 2021-03-17 VITALS
TEMPERATURE: 97.3 F | HEIGHT: 67 IN | BODY MASS INDEX: 30.29 KG/M2 | RESPIRATION RATE: 16 BRPM | HEART RATE: 76 BPM | WEIGHT: 193 LBS | DIASTOLIC BLOOD PRESSURE: 80 MMHG | SYSTOLIC BLOOD PRESSURE: 138 MMHG

## 2021-03-17 DIAGNOSIS — M54.50 CHRONIC MIDLINE LOW BACK PAIN WITHOUT SCIATICA: ICD-10-CM

## 2021-03-17 DIAGNOSIS — G89.29 CHRONIC MIDLINE LOW BACK PAIN WITHOUT SCIATICA: ICD-10-CM

## 2021-03-17 DIAGNOSIS — Z00.00 ANNUAL PHYSICAL EXAM: Primary | ICD-10-CM

## 2021-03-17 PROCEDURE — 99396 PREV VISIT EST AGE 40-64: CPT | Performed by: INTERNAL MEDICINE

## 2021-03-17 ASSESSMENT — PATIENT HEALTH QUESTIONNAIRE - PHQ9
SUM OF ALL RESPONSES TO PHQ9 QUESTIONS 1 & 2: 0
SUM OF ALL RESPONSES TO PHQ QUESTIONS 1-9: 0

## 2021-03-25 ENCOUNTER — HOSPITAL ENCOUNTER (OUTPATIENT)
Age: 66
Setting detail: SPECIMEN
Discharge: HOME | End: 2021-03-25
Payer: COMMERCIAL

## 2021-03-25 DIAGNOSIS — Z00.00 ANNUAL PHYSICAL EXAM: ICD-10-CM

## 2021-03-25 LAB
ANION GAP SERPL CALCULATED.3IONS-SCNC: 8 MMOL/L (ref 9–17)
BUN BLDV-MCNC: 16 MG/DL (ref 8–23)
BUN/CREAT BLD: ABNORMAL (ref 9–20)
CALCIUM SERPL-MCNC: 9.5 MG/DL (ref 8.6–10.4)
CHLORIDE BLD-SCNC: 104 MMOL/L (ref 98–107)
CO2: 27 MMOL/L (ref 20–31)
CREAT SERPL-MCNC: 0.6 MG/DL (ref 0.5–0.9)
GFR AFRICAN AMERICAN: >60 ML/MIN
GFR NON-AFRICAN AMERICAN: >60 ML/MIN
GFR SERPL CREATININE-BSD FRML MDRD: ABNORMAL ML/MIN/{1.73_M2}
GFR SERPL CREATININE-BSD FRML MDRD: ABNORMAL ML/MIN/{1.73_M2}
GLUCOSE BLD-MCNC: 92 MG/DL (ref 70–99)
HCT VFR BLD CALC: 38.1 % (ref 36.3–47.1)
HEMOGLOBIN: 12.6 G/DL (ref 11.9–15.1)
MCH RBC QN AUTO: 28.4 PG (ref 25.2–33.5)
MCHC RBC AUTO-ENTMCNC: 33.1 G/DL (ref 28.4–34.8)
MCV RBC AUTO: 86 FL (ref 82.6–102.9)
NRBC AUTOMATED: 0 PER 100 WBC
PDW BLD-RTO: 12.4 % (ref 11.8–14.4)
PLATELET # BLD: 196 K/UL (ref 138–453)
PMV BLD AUTO: 9.7 FL (ref 8.1–13.5)
POTASSIUM SERPL-SCNC: 4.3 MMOL/L (ref 3.7–5.3)
RBC # BLD: 4.43 M/UL (ref 3.95–5.11)
SODIUM BLD-SCNC: 139 MMOL/L (ref 135–144)
WBC # BLD: 3.8 K/UL (ref 3.5–11.3)

## 2021-04-27 ENCOUNTER — TELEPHONE (OUTPATIENT)
Dept: ORTHOPEDIC SURGERY | Age: 66
End: 2021-04-27

## 2021-04-28 NOTE — TELEPHONE ENCOUNTER
Letter Reviewed. Please let patient know that nerves can take some time to return to normal after prolonged pressure on them. It sounds like we are making progress with symptoms, just slowly. Would anticipate this to continue to improve. Would recommend continued PT/Home exercise for optimal strengthening and recovery.

## 2022-06-20 ENCOUNTER — TELEPHONE (OUTPATIENT)
Dept: INTERNAL MEDICINE CLINIC | Age: 67
End: 2022-06-20

## 2022-06-20 NOTE — TELEPHONE ENCOUNTER
LAST OV: 3/12/21    Patient advised to be seen in office--scheduled apt for next available 7/22 with Anna Anguiano. Patient is experiencing nasal drainage, with no other symptoms such as fever, sore throat or loss of appetite. Has taken 3 COVID tests, all negative. She is wondering if an antibiotic will help get rid of symptoms.     Pharmacy- [address] [place] [address]    Please advise

## 2022-06-21 RX ORDER — AZELASTINE 1 MG/ML
1 SPRAY, METERED NASAL 2 TIMES DAILY
Qty: 60 ML | Refills: 1 | Status: SHIPPED | OUTPATIENT
Start: 2022-06-21

## 2022-07-22 ENCOUNTER — OFFICE VISIT (OUTPATIENT)
Dept: INTERNAL MEDICINE CLINIC | Age: 67
End: 2022-07-22
Payer: MEDICARE

## 2022-07-22 VITALS
SYSTOLIC BLOOD PRESSURE: 136 MMHG | BODY MASS INDEX: 28.88 KG/M2 | OXYGEN SATURATION: 98 % | WEIGHT: 184 LBS | HEART RATE: 78 BPM | HEIGHT: 67 IN | DIASTOLIC BLOOD PRESSURE: 78 MMHG

## 2022-07-22 DIAGNOSIS — Z12.31 ENCOUNTER FOR SCREENING MAMMOGRAM FOR MALIGNANT NEOPLASM OF BREAST: ICD-10-CM

## 2022-07-22 DIAGNOSIS — J01.90 SUBACUTE SINUSITIS, UNSPECIFIED LOCATION: Primary | ICD-10-CM

## 2022-07-22 PROCEDURE — 3017F COLORECTAL CA SCREEN DOC REV: CPT | Performed by: INTERNAL MEDICINE

## 2022-07-22 PROCEDURE — G8427 DOCREV CUR MEDS BY ELIG CLIN: HCPCS | Performed by: INTERNAL MEDICINE

## 2022-07-22 PROCEDURE — 99213 OFFICE O/P EST LOW 20 MIN: CPT | Performed by: INTERNAL MEDICINE

## 2022-07-22 PROCEDURE — G8399 PT W/DXA RESULTS DOCUMENT: HCPCS | Performed by: INTERNAL MEDICINE

## 2022-07-22 PROCEDURE — 1036F TOBACCO NON-USER: CPT | Performed by: INTERNAL MEDICINE

## 2022-07-22 PROCEDURE — 1090F PRES/ABSN URINE INCON ASSESS: CPT | Performed by: INTERNAL MEDICINE

## 2022-07-22 PROCEDURE — G8417 CALC BMI ABV UP PARAM F/U: HCPCS | Performed by: INTERNAL MEDICINE

## 2022-07-22 PROCEDURE — 1123F ACP DISCUSS/DSCN MKR DOCD: CPT | Performed by: INTERNAL MEDICINE

## 2022-07-22 RX ORDER — DOXYCYCLINE HYCLATE 100 MG
100 TABLET ORAL 2 TIMES DAILY
Qty: 14 TABLET | Refills: 0 | Status: SHIPPED | OUTPATIENT
Start: 2022-07-22 | End: 2022-07-29

## 2022-07-22 SDOH — ECONOMIC STABILITY: FOOD INSECURITY: WITHIN THE PAST 12 MONTHS, THE FOOD YOU BOUGHT JUST DIDN'T LAST AND YOU DIDN'T HAVE MONEY TO GET MORE.: NEVER TRUE

## 2022-07-22 SDOH — ECONOMIC STABILITY: FOOD INSECURITY: WITHIN THE PAST 12 MONTHS, YOU WORRIED THAT YOUR FOOD WOULD RUN OUT BEFORE YOU GOT MONEY TO BUY MORE.: NEVER TRUE

## 2022-07-22 ASSESSMENT — PATIENT HEALTH QUESTIONNAIRE - PHQ9
SUM OF ALL RESPONSES TO PHQ9 QUESTIONS 1 & 2: 0
2. FEELING DOWN, DEPRESSED OR HOPELESS: 0
1. LITTLE INTEREST OR PLEASURE IN DOING THINGS: 0
SUM OF ALL RESPONSES TO PHQ QUESTIONS 1-9: 0

## 2022-07-22 ASSESSMENT — SOCIAL DETERMINANTS OF HEALTH (SDOH): HOW HARD IS IT FOR YOU TO PAY FOR THE VERY BASICS LIKE FOOD, HOUSING, MEDICAL CARE, AND HEATING?: NOT HARD AT ALL

## 2022-07-22 NOTE — PROGRESS NOTES
Visit Information    Have you changed or started any medications since your last visit including any over-the-counter medicines, vitamins, or herbal medicines? no   Are you having any side effects from any of your medications? -  no  Have you stopped taking any of your medications? Is so, why? -  yes - Not taking any medications     Have you seen any other physician or provider since your last visit? No  Have you had any other diagnostic tests since your last visit? Yes - Records Obtained  Have you been seen in the emergency room and/or had an admission to a hospital since we last saw you? No  Have you had your routine dental cleaning in the past 6 months? yes -     Have you activated your [employer] account? If not, what are your barriers?  Yes     Patient Care Team:  Leigha Delarosa MD as PCP - General (Internal Medicine)  Leigha Delarosa MD as PCP - [facility]  Shelby Lubin MD as Consulting Physician (Dermatology)  Chandra Argueta MD as Consulting Physician (Urology)  Serjio Luther as Consulting Physician (Colon and Rectal Surgery)  Henry Le MD as Consulting Physician (Orthopedic Surgery)  Christy Ashley MD as Consulting Physician (Dermatology)    Medical History Review  Past Medical, Family, and Social History reviewed and does contribute to the patient presenting condition    Health Maintenance   Topic Date Due    COVID-19 Vaccine (1) Never done    HIV screen  Never done    Depression Screen  03/17/2022    Pneumococcal 65+ years Vaccine (1 - PCV) Never done    Flu vaccine (1) 09/01/2022    Breast cancer screen  09/10/2022    Cervical cancer screen  09/13/2022    Lipids  02/06/2025    Colorectal Cancer Screen  11/16/2025    DTaP/Tdap/Td vaccine (3 - Td or Tdap) 08/21/2028    DEXA (modify frequency per FRAX score)  Completed    Shingles vaccine  Completed    Hepatitis C screen  Completed    Hepatitis A vaccine  Aged Out    Hepatitis B vaccine  Aged Out    Hib vaccine  Aged Out Meningococcal (ACWY) vaccine  Aged Out     SUBJECTIVE:  Meche Castillo is a 74 y.o. female patient who  comes for complaints of   Chief Complaint   Patient presents with    Sinus Problem     Started 5/19/22, has gotten better but still not completely gone. Congested in the morning and at night        Nasal congstion, green discharge- 2mtrh  Dry th roat  Getting loss of smell /taste  Sore throat, runny nose, congestion, intgermittent cough, raspy voice  Took several covid tests over the time , last one yesterdya- and neg  No fevers    Never tried the azelastine spray  Tried fluticasone at home- did not help        REVIEW OF SYSTEMS (except Subjective (HPI))  GENERAL: No fevers / chills  RESPIRATORY: postive for cough, no shortness of breath  CARDIOVASCULAR: Negative for chest pain or palpitations. GI: no nausea, vomiting, or diarrhea  NEURO: No history of headaches    Past Medical History:   Diagnosis Date    Arthritis     rt. hand, back.     Cancer ([address]     skin (basal cell)    History of renal calculi 11/14    Hyperglycemia 8/17/2018    Other chronic cystitis with hematuria     saw Dr. Evelio Mendez    Plantar fasciitis 11/14    right > left saw Dr. Zach Head    Right carpal tunnel syndrome     per Dr. Arthur Park, is wearing splint at [facility]    Wears glasses        SOCIAL HISTORY:  Social History     Socioeconomic History    Marital status:      Spouse name: Not on file    Number of children: Not on file    Years of education: Not on file    Highest education level: Not on file   Occupational History    Not on file   Tobacco Use    Smoking status: Never    Smokeless tobacco: Never   Vaping Use    Vaping Use: Never used   Substance and Sexual Activity    Alcohol use: No    Drug use: No    Sexual activity: Yes     Partners: Male   Other Topics Concern    Not on file   Social History Narrative    Not on file     Social Determinants of Health     Financial Resource Strain: Low Risk     Difficulty of Paying Living Expenses: Not hard at all   Food Insecurity: No Food Insecurity    Worried About Running Out of Food in the Last Year: Never true    Ran Out of Food in the Last Year: Never true   Transportation Needs: Not on file   Physical Activity: Not on file   Stress: Not on file   Social Connections: Not on file   Intimate Partner Violence: Not on file   Housing Stability: Not on file           CURRENT MEDICATIONS:  Current Outpatient Medications   Medication Sig Dispense Refill    azelastine (ASTELIN) 0.1 % nasal spray 1 spray by Nasal route 2 times daily Use in each nostril as directed (Patient not taking: Reported on 7/22/2022) 60 mL 1    Multiple Vitamins-Minerals (THERAPEUTIC MULTIVITAMIN-MINERALS) tablet Take 1 tablet by mouth daily (Patient not taking: Reported on 7/22/2022)       No current facility-administered medications for this visit. OBJECTIVE:  Vitals:    07/22/22 1315   BP: 136/78   Pulse:    SpO2:      Body mass index is 28.82 kg/m². Focal exam:  Throat - normal  No cervical LN  No sinus tenderness  Chest clear    General exam (except above):  General appearance - well appearing, alert, in no acute distress  Head - Atraumatic, normocephalic  Eyes - EOMI, no jaundice or pallor  Lungs - Lungs clear to auscultation. No wheezing, rhonchi, rales  Heart - RRR without murmur, gallop, or rubs. No ectopy  Abdomen - Abdomen soft, non-tender. Bowel sounds normal. No masses, organomegaly  Extremities -No significant edema, or skin discoloration. Good capillary refill. Neuro - Pt Alert, awake and oriented x 3. No gross focal neurological deficits    ASSESSMENT AND PLAN (MEDICAL DECISION MAKING):   Pita Serrano was seen today for sinus problem. Diagnoses and all orders for this visit:    Subacute sinusitis, unspecified location  -     doxycycline hyclate (VIBRA-TABS) 100 MG tablet; Take 1 tablet by mouth in the morning and 1 tablet before bedtime. Do all this for 7 days.        Pt declined pneumonia shot for now    Follow up in: as needed      Gladis Pace MD

## 2022-08-09 ENCOUNTER — HOSPITAL ENCOUNTER (OUTPATIENT)
Dept: WOMENS IMAGING | Age: 67
Discharge: HOME | End: 2022-08-11
Payer: MEDICARE

## 2022-08-09 DIAGNOSIS — Z12.31 ENCOUNTER FOR SCREENING MAMMOGRAM FOR MALIGNANT NEOPLASM OF BREAST: ICD-10-CM

## 2022-08-09 PROCEDURE — 77063 BREAST TOMOSYNTHESIS BI: CPT

## 2023-02-06 ENCOUNTER — TELEPHONE (OUTPATIENT)
Dept: GASTROENTEROLOGY | Age: 68
End: 2023-02-06

## 2023-02-06 NOTE — TELEPHONE ENCOUNTER
Patient call asking when her and her  were due for Colonoscopies. Her  is due in May by dr Hansa Avalos and she is due in 11/2025. Her colon was done by a Dr Arleth Serrato. She requests her next colonoscopy to be with Dr Hansa Avalos.    Referral put in her chart for 11/2025

## 2023-02-20 ENCOUNTER — TELEPHONE (OUTPATIENT)
Dept: INTERNAL MEDICINE CLINIC | Age: 68
End: 2023-02-20

## 2023-03-06 SDOH — HEALTH STABILITY: PHYSICAL HEALTH: ON AVERAGE, HOW MANY MINUTES DO YOU ENGAGE IN EXERCISE AT THIS LEVEL?: 50 MIN

## 2023-03-06 SDOH — HEALTH STABILITY: PHYSICAL HEALTH: ON AVERAGE, HOW MANY DAYS PER WEEK DO YOU ENGAGE IN MODERATE TO STRENUOUS EXERCISE (LIKE A BRISK WALK)?: 2 DAYS

## 2023-03-06 ASSESSMENT — PATIENT HEALTH QUESTIONNAIRE - PHQ9
SUM OF ALL RESPONSES TO PHQ9 QUESTIONS 1 & 2: 0
SUM OF ALL RESPONSES TO PHQ QUESTIONS 1-9: 0
1. LITTLE INTEREST OR PLEASURE IN DOING THINGS: 0
SUM OF ALL RESPONSES TO PHQ QUESTIONS 1-9: 0
2. FEELING DOWN, DEPRESSED OR HOPELESS: 0

## 2023-03-06 ASSESSMENT — LIFESTYLE VARIABLES
HOW MANY STANDARD DRINKS CONTAINING ALCOHOL DO YOU HAVE ON A TYPICAL DAY: PATIENT DOES NOT DRINK
HOW OFTEN DO YOU HAVE A DRINK CONTAINING ALCOHOL: NEVER
HOW MANY STANDARD DRINKS CONTAINING ALCOHOL DO YOU HAVE ON A TYPICAL DAY: 0
HOW OFTEN DO YOU HAVE A DRINK CONTAINING ALCOHOL: 1
HOW OFTEN DO YOU HAVE SIX OR MORE DRINKS ON ONE OCCASION: 1

## 2023-03-13 ENCOUNTER — OFFICE VISIT (OUTPATIENT)
Dept: INTERNAL MEDICINE CLINIC | Age: 68
End: 2023-03-13
Payer: MEDICARE

## 2023-03-13 VITALS
BODY MASS INDEX: 29.82 KG/M2 | WEIGHT: 190 LBS | DIASTOLIC BLOOD PRESSURE: 74 MMHG | SYSTOLIC BLOOD PRESSURE: 126 MMHG | HEIGHT: 67 IN

## 2023-03-13 DIAGNOSIS — Z13.820 SCREENING FOR OSTEOPOROSIS: ICD-10-CM

## 2023-03-13 DIAGNOSIS — Z78.0 POSTMENOPAUSAL: ICD-10-CM

## 2023-03-13 DIAGNOSIS — Z11.4 ENCOUNTER FOR SCREENING FOR HIV: ICD-10-CM

## 2023-03-13 DIAGNOSIS — Z13.220 LIPID SCREENING: ICD-10-CM

## 2023-03-13 DIAGNOSIS — Z13.1 DIABETES MELLITUS SCREENING: ICD-10-CM

## 2023-03-13 DIAGNOSIS — Z00.00 WELCOME TO MEDICARE PREVENTIVE VISIT: Primary | ICD-10-CM

## 2023-03-13 DIAGNOSIS — H93.13 TINNITUS OF BOTH EARS: ICD-10-CM

## 2023-03-13 DIAGNOSIS — Z00.00 MEDICARE ANNUAL WELLNESS VISIT, INITIAL: ICD-10-CM

## 2023-03-13 PROCEDURE — G8484 FLU IMMUNIZE NO ADMIN: HCPCS | Performed by: INTERNAL MEDICINE

## 2023-03-13 PROCEDURE — G0438 PPPS, INITIAL VISIT: HCPCS | Performed by: INTERNAL MEDICINE

## 2023-03-13 PROCEDURE — 3017F COLORECTAL CA SCREEN DOC REV: CPT | Performed by: INTERNAL MEDICINE

## 2023-03-13 PROCEDURE — 1123F ACP DISCUSS/DSCN MKR DOCD: CPT | Performed by: INTERNAL MEDICINE

## 2023-03-13 SDOH — ECONOMIC STABILITY: HOUSING INSECURITY
IN THE LAST 12 MONTHS, WAS THERE A TIME WHEN YOU DID NOT HAVE A STEADY PLACE TO SLEEP OR SLEPT IN A SHELTER (INCLUDING NOW)?: NO

## 2023-03-13 SDOH — ECONOMIC STABILITY: FOOD INSECURITY: WITHIN THE PAST 12 MONTHS, THE FOOD YOU BOUGHT JUST DIDN'T LAST AND YOU DIDN'T HAVE MONEY TO GET MORE.: NEVER TRUE

## 2023-03-13 SDOH — ECONOMIC STABILITY: FOOD INSECURITY: WITHIN THE PAST 12 MONTHS, YOU WORRIED THAT YOUR FOOD WOULD RUN OUT BEFORE YOU GOT MONEY TO BUY MORE.: NEVER TRUE

## 2023-03-13 SDOH — ECONOMIC STABILITY: INCOME INSECURITY: HOW HARD IS IT FOR YOU TO PAY FOR THE VERY BASICS LIKE FOOD, HOUSING, MEDICAL CARE, AND HEATING?: NOT HARD AT ALL

## 2023-03-13 NOTE — PROGRESS NOTES
Visit Information    Have you changed or started any medications since your last visit including any over-the-counter medicines, vitamins, or herbal medicines? no   Are you having any side effects from any of your medications? -  no  Have you stopped taking any of your medications? Is so, why? -  no    Have you seen any other physician or provider since your last visit? No  Have you had any other diagnostic tests since your last visit? No  Have you been seen in the emergency room and/or had an admission to a hospital since we last saw you? No  Have you had your routine dental cleaning in the past 6 months? no    Have you activated your [employer] account? If not, what are your barriers?  Yes     Patient Care Team:  Alicia Copeland MD as PCP - General (Internal Medicine)  Alicia Copeland MD as PCP - [facility]  Briana Freeman MD as Consulting Physician (Dermatology)  Burt Quarles MD as Consulting Physician (Urology)  Pooja Simeon as Consulting Physician (Colon and Rectal Surgery)  Tonia Raphael MD as Consulting Physician (Orthopedic Surgery)  Nai Hooks MD as Consulting Physician (Dermatology)    Medical History Review  Past Medical, Family, and Social History reviewed and does not contribute to the patient presenting condition    Health Maintenance   Topic Date Due    HIV screen  Never done    Annual Wellness Visit (AWV)  Never done    Cervical cancer screen  09/13/2022    COVID-19 Vaccine (2 - Collie Piper series) 10/05/2022    Depression Screen  03/06/2024    Breast cancer screen  08/09/2024    Lipids  02/06/2025    Colorectal Cancer Screen  11/16/2025    DTaP/Tdap/Td vaccine (3 - Td or Tdap) 08/21/2028    DEXA (modify frequency per FRAX score)  Completed    Flu vaccine  Completed    Shingles vaccine  Completed    Pneumococcal 65+ years Vaccine  Completed    Hepatitis C screen  Completed    Hepatitis A vaccine  Aged Out    Hib vaccine  Aged Out    Meningococcal (ACWY) vaccine  Aged [employer] Annual Wellness Visit    Lesley Montero is here for Medicare AWV (Medicare Annual Wellness )    Assessment & Plan   Welcome to Medicare preventive visit  Tinnitus of both ears  -     AFL - Elba Melara MD, Otolaryngology, [place]  Lipid screening  -     Lipid, Fasting; Future  Diabetes mellitus screening  -     Glucose, Fasting; Future  Encounter for screening for HIV  -     HIV Screen; Future  Screening for osteoporosis  -     DEXA BONE DENSITY AXIAL SKELETON; Future  Postmenopausal  -     DEXA BONE DENSITY AXIAL SKELETON; Future  Medicare annual wellness visit, initial    Recommendations for Preventive Services Due: see orders and patient instructions/AVS.  Recommended screening schedule for the next 5-10 years is provided to the patient in written form: see Patient Instructions/AVS.     No follow-ups on file. Subjective       Patient's complete Health Risk Assessment and screening values have been reviewed and are found in Flowsheets. The following problems were reviewed today and where indicated follow up appointments were made and/or referrals ordered. Positive Risk Factor Screenings with Interventions:                   Hearing Screen:  Do you or your family notice any trouble with your hearing that hasn't been managed with hearing aids?: (!) Yes  Interventions:  Referred to ENT    Vision Screen:  Do you have difficulty driving, watching TV, or doing any of your daily activities because of your eyesight?: No  Have you had an eye exam within the past year?: (!) No  No results found. Interventions:   Patient encouraged to make appointment with their eye specialist          No recent falls  Fall prevention strategies discussed     Never smoked      CV Risk Counseling:  Patient was asked about her current diet and exercise habits, and personalized advice was provided regarding recommended lifestyle changes.  Patient's individual cardiovascular disease risk factors, including advanced age (> 54 for men, > 72 for women), were discussed, as well as the likely benefits of lifestyle changes. Based upon patient's motivation to change her behavior, the following plan was agreed upon to work toward lowering cardiovascular disease risk: low saturated fat, low cholesterol diet and Mediterranean diet. Aspirin use for primary prevention of cardiovascular disease for men 45-79 and women 55-79: Not indicated. Educational materials for lifestyle changes were provided. Patient will follow-up in 6 month(s) with PCP. Provider spent 5 minutes counseling patient. Objective   Vitals:    03/13/23 0936   BP: 126/84   Site: Right Upper Arm   Weight: 190 lb (86.2 kg)   Height: 5' 7\" (1.702 m)      Body mass index is 29.76 kg/m². No Known Allergies  Prior to Visit Medications    Medication Sig Taking?  Authorizing Provider   azelastine (ASTELIN) 0.1 % nasal spray 1 spray by Nasal route 2 times daily Use in each nostril as directed  Patient not taking: Reported on 7/22/2022  Alicia Copeland MD   Multiple Vitamins-Minerals (THERAPEUTIC MULTIVITAMIN-MINERALS) tablet Take 1 tablet by mouth daily  Patient not taking: No sig reported  Historical Provider, MD [place] (Including outside providers/suppliers regularly involved in providing care):   Patient Care Team:  Alicia Copeland MD as PCP - General (Internal Medicine)  Alicia Copeland MD as PCP - Empan[facility] Provider  Briana Freeman MD as Consulting Physician (Dermatology)  Burt Quarles MD as Consulting Physician (Urology)  Pooja Simeon as Consulting Physician (Colon and Rectal Surgery)  Tonia Raphael MD as Consulting Physician (Orthopedic Surgery)  Nai Hooks MD as Consulting Physician (Dermatology)     Reviewed and updated this visit:  Allergies  Meds

## 2023-03-13 NOTE — PATIENT INSTRUCTIONS
Learning About Vision Tests  What are vision tests? The four most common vision tests are visual acuity tests, refraction, visual field tests, and color vision tests. Visual acuity (sharpness) tests  These tests are used: To see if you need glasses or contact lenses. To monitor an eye problem. To check an eye injury. Visual acuity tests are done as part of routine exams. You may also have this test when you get your 's license or apply for some types of jobs. Visual field tests  These tests are used: To check for vision loss in any area of your range of vision. To screen for certain eye diseases. To look for nerve damage after a stroke, head injury, or other problem that could reduce blood flow to the brain. Refraction and color tests  A refraction test is done to find the right prescription for glasses and contact lenses. A color vision test is done to check for color blindness. Color vision is often tested as part of a routine exam. You may also have this test when you apply for a job where recognizing different colors is important, such as , electronics, or the [employer]. How are vision tests done? Visual acuity test   You cover one eye at a time. You read aloud from a wall chart across the room. You read aloud from a small card that you hold in your hand. Refraction   You look into a special device. The device puts lenses of different strengths in front of each eye to see how strong your glasses or contact lenses need to be. Visual field tests   Your doctor may have you look through special machines. Or your doctor may simply have you stare straight ahead while they move a finger into and out of your field of vision. Color vision test   You look at pieces of printed test patterns in various colors. You say what number or symbol you see. Your doctor may have you trace the number or symbol using a pointer. How do these tests feel?   There is very little chance of having a problem from this test. If dilating drops are used for a vision test, they may make the eyes sting and cause a medicine taste in the mouth. Follow-up care is a key part of your treatment and safety. Be sure to make and go to all appointments, and call your doctor if you are having problems. It's also a good idea to know your test results and keep a list of the medicines you take. Where can you learn more? Go to http://www.locke.com/ and enter G551 to learn more about \"Learning About Vision Tests. \"  Current as of: October 12, 2022               Content Version: 13.5  © 7549-8621 [employer]. Care instructions adapted under license by [facility] ([facility]). If you have questions about a medical condition or this instruction, always ask your healthcare professional. [address] any warranty or liability for your use of this information. Advance Directives: Care Instructions  Overview  An advance directive is a legal way to state your wishes at the end of your life. It tells your family and your doctor what to do if you can't say what you want. There are two main types of advance directives. You can change them any time your wishes change. Living will. This form tells your family and your doctor your wishes about life support and other treatment. The form is also called a declaration. Medical power of . This form lets you name a person to make treatment decisions for you when you can't speak for yourself. This person is called a health care agent (health care proxy, health care surrogate). The form is also called a durable power of  for health care. If you do not have an advance directive, decisions about your medical care may be made by a family member, or by a doctor or a  who doesn't know you. It may help to think of an advance directive as a gift to the people who care for you.  If you have one, they won't have to make tough decisions by themselves. For more information, including forms for your state, see the [address] website (www.caringinfo.org/planning/advance-directives/). Follow-up care is a key part of your treatment and safety. Be sure to make and go to all appointments, and call your doctor if you are having problems. It's also a good idea to know your test results and keep a list of the medicines you take. What should you include in an advance directive? Many states have a unique advance directive form. (It may ask you to address specific issues.) Or you might use a universal form that's approved by many states. If your form doesn't tell you what to address, it may be hard to know what to include in your advance directive. Use the questions below to help you get started. Who do you want to make decisions about your medical care if you are not able to? What life-support measures do you want if you have a serious illness that gets worse over time or can't be cured? What are you most afraid of that might happen? (Maybe you're afraid of having pain, losing your independence, or being kept alive by machines.)  Where would you prefer to die? (Your home? A hospital? A nursing home?)  Do you want to donate your organs when you die? Do you want certain Christianity practices performed before you die? When should you call for help? Be sure to contact your doctor if you have any questions. Where can you learn more? Go to http://www.locke.com/ and enter R264 to learn more about \"Advance Directives: Care Instructions. \"  Current as of: June 16, 2022               Content Version: 13.5  © 3856-1965 [employer]. Care instructions adapted under license by [address] If you have questions about a medical condition or this instruction, always ask your healthcare professional. [place] [address] any warranty or liability for your use of this information.            A Healthy Heart: Care Instructions  Your Care Instructions     Coronary artery disease, also called heart disease, occurs when a substance called plaque builds up in the vessels that supply oxygen-rich blood to your heart muscle. This can narrow the blood vessels and reduce blood flow. A heart attack happens when blood flow is completely blocked. A high-fat diet, smoking, and other factors increase the risk of heart disease. Your doctor has found that you have a chance of having heart disease. You can do lots of things to keep your heart healthy. It may not be easy, but you can change your diet, exercise more, and quit smoking. These steps really work to lower your chance of heart disease. Follow-up care is a key part of your treatment and safety. Be sure to make and go to all appointments, and call your doctor if you are having problems. It's also a good idea to know your test results and keep a list of the medicines you take. How can you care for yourself at home? Diet    Use less salt when you cook and eat. This helps lower your blood pressure. Taste food before salting. Add only a little salt when you think you need it. With time, your taste buds will adjust to less salt.     Eat fewer snack items, fast foods, canned soups, and other high-salt, high-fat, processed foods.     Read food labels and try to avoid saturated and trans fats. They increase your risk of heart disease by raising cholesterol levels.     Limit the amount of solid fat-butter, margarine, and shortening-you eat. Use olive, peanut, or canola oil when you cook. Bake, broil, and steam foods instead of frying them.     Eat a variety of fruit and vegetables every day. Dark green, deep orange, red, or yellow fruits and vegetables are especially good for you. Examples include spinach, carrots, peaches, and berries.     Foods high in fiber can reduce your cholesterol and provide important vitamins and minerals.  High-fiber foods include whole-grain cereals and breads, oatmeal, beans, brown rice, citrus fruits, and apples.     Eat lean proteins. Heart-healthy proteins include seafood, lean meats and poultry, eggs, beans, peas, nuts, seeds, and soy products.     Limit drinks and foods with added sugar. These include candy, desserts, and soda pop. Lifestyle changes    If your doctor recommends it, get more exercise. Walking is a good choice. Bit by bit, increase the amount you walk every day. Try for at least 30 minutes on most days of the week. You also may want to swim, bike, or do other activities.     Do not smoke. If you need help quitting, talk to your doctor about stop-smoking programs and medicines. These can increase your chances of quitting for good. Quitting smoking may be the most important step you can take to protect your heart. It is never too late to quit.     Limit alcohol to 2 drinks a day for men and 1 drink a day for women. Too much alcohol can cause health problems.     Manage other health problems such as diabetes, high blood pressure, and high cholesterol. If you think you may have a problem with alcohol or drug use, talk to your doctor. Medicines    Take your medicines exactly as prescribed. Call your doctor if you think you are having a problem with your medicine.     If your doctor recommends aspirin, take the amount directed each day. Make sure you take aspirin and not another kind of pain reliever, such as acetaminophen (Tylenol). When should you call for help? Call 911 if you have symptoms of a heart attack. These may include:    Chest pain or pressure, or a strange feeling in the chest.     Sweating.     Shortness of breath.     Pain, pressure, or a strange feeling in the back, neck, jaw, or upper belly or in one or both shoulders or arms.     Lightheadedness or sudden weakness.     A fast or irregular heartbeat. After you call 911, the  may tell you to chew 1 adult-strength or 2 to 4 low-dose aspirin. Wait for an ambulance.  Do not try to drive yourself. Watch closely for changes in your health, and be sure to contact your doctor if you have any problems. Where can you learn more? Go to http://www.locke.com/ and enter F075 to learn more about \"A Healthy Heart: Care Instructions. \"  Current as of: September 7, 2022               Content Version: 13.5  © 2006-2022 [employer]. Care instructions adapted under license by [facility] ([facility]). If you have questions about a medical condition or this instruction, always ask your healthcare professional. [address] any warranty or liability for your use of this information. Personalized Preventive Plan for Amauri Avila - 3/13/2023  Medicare offers a range of preventive health benefits. Some of the tests and screenings are paid in full while other may be subject to a deductible, co-insurance, and/or copay. Some of these benefits include a comprehensive review of your medical history including lifestyle, illnesses that may run in your family, and various assessments and screenings as appropriate. After reviewing your medical record and screening and assessments performed today your provider may have ordered immunizations, labs, imaging, and/or referrals for you. A list of these orders (if applicable) as well as your Preventive Care list are included within your After Visit Summary for your review. Other Preventive Recommendations:    A preventive eye exam performed by an eye specialist is recommended every 1-2 years to screen for glaucoma; cataracts, macular degeneration, and other eye disorders. A preventive dental visit is recommended every 6 months. Try to get at least 150 minutes of exercise per week or 10,000 steps per day on a pedometer . Order or download the FREE \"Exercise & Physical Activity: Your Everyday Guide\" from [employer]. Call 6-136.267.5099 or search [employer] online.   You need 0989-5886 mg of calcium and 3068-8383 IU of vitamin D per day. It is possible to meet your calcium requirement with diet alone, but a vitamin D supplement is usually necessary to meet this goal.  When exposed to the sun, use a sunscreen that protects against both UVA and UVB radiation with an SPF of 30 or greater. Reapply every 2 to 3 hours or after sweating, drying off with a towel, or swimming.  Always wear a seat belt when traveling in a car. Always wear a helmet when riding a bicycle or motorcycle.

## 2023-03-22 ENCOUNTER — HOSPITAL ENCOUNTER (OUTPATIENT)
Age: 68
Setting detail: SPECIMEN
Discharge: HOME | End: 2023-03-22

## 2023-03-22 DIAGNOSIS — Z13.1 DIABETES MELLITUS SCREENING: ICD-10-CM

## 2023-03-22 DIAGNOSIS — Z13.220 LIPID SCREENING: ICD-10-CM

## 2023-03-22 DIAGNOSIS — Z11.4 ENCOUNTER FOR SCREENING FOR HIV: ICD-10-CM

## 2023-03-22 LAB
CHOLEST SERPL-MCNC: 198 MG/DL
CHOLESTEROL/HDL RATIO: 3.2
GLUCOSE SERPL-MCNC: 93 MG/DL (ref 70–99)
HDLC SERPL-MCNC: 61 MG/DL
HIV 1+2 AB+HIV1 P24 AG SERPL QL IA: NONREACTIVE
LDLC SERPL CALC-MCNC: 114 MG/DL (ref 0–130)
TRIGL SERPL-MCNC: 113 MG/DL

## 2023-04-17 ENCOUNTER — HOSPITAL ENCOUNTER (OUTPATIENT)
Dept: WOMENS IMAGING | Age: 68
Discharge: HOME | End: 2023-04-19
Payer: MEDICARE

## 2023-04-17 DIAGNOSIS — Z13.820 SCREENING FOR OSTEOPOROSIS: ICD-10-CM

## 2023-04-17 DIAGNOSIS — Z78.0 POSTMENOPAUSAL: ICD-10-CM

## 2023-04-17 PROCEDURE — 77080 DXA BONE DENSITY AXIAL: CPT

## 2023-04-25 ENCOUNTER — OFFICE VISIT (OUTPATIENT)
Dept: INTERNAL MEDICINE CLINIC | Age: 68
End: 2023-04-25
Payer: MEDICARE

## 2023-04-25 VITALS
DIASTOLIC BLOOD PRESSURE: 84 MMHG | BODY MASS INDEX: 29.76 KG/M2 | SYSTOLIC BLOOD PRESSURE: 130 MMHG | WEIGHT: 190 LBS | HEART RATE: 84 BPM | OXYGEN SATURATION: 98 %

## 2023-04-25 DIAGNOSIS — H10.9 CONJUNCTIVITIS OF BOTH EYES, UNSPECIFIED CONJUNCTIVITIS TYPE: ICD-10-CM

## 2023-04-25 DIAGNOSIS — J02.9 PHARYNGITIS, UNSPECIFIED ETIOLOGY: Primary | ICD-10-CM

## 2023-04-25 PROCEDURE — 99213 OFFICE O/P EST LOW 20 MIN: CPT | Performed by: INTERNAL MEDICINE

## 2023-04-25 PROCEDURE — 1123F ACP DISCUSS/DSCN MKR DOCD: CPT | Performed by: INTERNAL MEDICINE

## 2023-04-25 PROCEDURE — 1090F PRES/ABSN URINE INCON ASSESS: CPT | Performed by: INTERNAL MEDICINE

## 2023-04-25 PROCEDURE — 3017F COLORECTAL CA SCREEN DOC REV: CPT | Performed by: INTERNAL MEDICINE

## 2023-04-25 PROCEDURE — G8417 CALC BMI ABV UP PARAM F/U: HCPCS | Performed by: INTERNAL MEDICINE

## 2023-04-25 PROCEDURE — G8399 PT W/DXA RESULTS DOCUMENT: HCPCS | Performed by: INTERNAL MEDICINE

## 2023-04-25 PROCEDURE — 1036F TOBACCO NON-USER: CPT | Performed by: INTERNAL MEDICINE

## 2023-04-25 PROCEDURE — G8427 DOCREV CUR MEDS BY ELIG CLIN: HCPCS | Performed by: INTERNAL MEDICINE

## 2023-04-25 RX ORDER — CIPROFLOXACIN HYDROCHLORIDE 3.5 MG/ML
1 SOLUTION/ DROPS TOPICAL
Qty: 1 EACH | Refills: 0 | Status: SHIPPED | OUTPATIENT
Start: 2023-04-25 | End: 2023-05-02

## 2023-04-25 RX ORDER — CIPROFLOXACIN HYDROCHLORIDE 3.5 MG/ML
1 SOLUTION/ DROPS TOPICAL
Qty: 1 EACH | Refills: 0 | Status: SHIPPED | OUTPATIENT
Start: 2023-04-25 | End: 2023-04-25

## 2023-04-25 RX ORDER — AZITHROMYCIN 250 MG/1
250 TABLET, FILM COATED ORAL SEE ADMIN INSTRUCTIONS
Qty: 6 TABLET | Refills: 0 | Status: SHIPPED | OUTPATIENT
Start: 2023-04-25 | End: 2023-04-25

## 2023-04-25 RX ORDER — AZITHROMYCIN 250 MG/1
250 TABLET, FILM COATED ORAL SEE ADMIN INSTRUCTIONS
Qty: 6 TABLET | Refills: 0 | Status: SHIPPED | OUTPATIENT
Start: 2023-04-25 | End: 2023-04-30

## 2023-04-25 NOTE — PROGRESS NOTES
[address]  [place] [address]  Dept: [phone]  Dept Fax: [phone]    Office Progress/Follow Up Note  Date of patient's visit: 4/25/2023  Patient's Name:  Fernanda Light YOB: 1955            Patient Care Team:  Noe Chaves MD as PCP - General (Internal Medicine)  Noe Chaves MD as PCP - Empaneled Provider  Valentina Scmhidt MD as Consulting Physician (Dermatology)  Sabi Wood MD as Consulting Physician (Urology)  Sarahy Bush as Consulting Physician (Colon and Rectal Surgery)  Alma Caicedo MD as Consulting Physician (Orthopedic Surgery)  Chasity Melchor MD as Consulting Physician (Dermatology)    REASON FOR VISIT: Routine outpatient follow up/Same day visit/Post hospital/ED visit    HISTORY OF PRESENT ILLNESS:      Chief Complaint   Patient presents with    Conjunctivitis     Patient is here today for pink eye after her grandson and daughter         History was obtained from the patient. Fernanda Light is a 74 y.o. is here for a   Red watery eyes  x 1 week, discharge crusting present, getting worse,  Presenting symptoms  u respiratory symptoms,   Productive cough, nasal congestion   No fever and chills   Patient Active Problem List   Diagnosis    History of renal calculi    Other chronic cystitis with hematuria    Bilateral plantar fasciitis    Chronic eczematous otitis externa of left ear    Chronic midline low back pain without sciatica    Arthritis       Health Maintenance Due   Topic Date Due    Cervical cancer screen  09/13/2022    COVID-19 Vaccine (2 - Moderna series) 10/05/2022       No Known Allergies      MEDICATIONS:     No current outpatient medications on file. No current facility-administered medications for this visit. SOCIAL HISTORY    Reviewed and no change from previous record. Xi Eid  reports that she has never smoked.  She has never used smokeless tobacco.    FAMILY HISTORY:    Reviewed and No

## 2023-04-25 NOTE — PROGRESS NOTES
Visit Information    Have you changed or started any medications since your last visit including any over-the-counter medicines, vitamins, or herbal medicines? no   Are you having any side effects from any of your medications? -  no  Have you stopped taking any of your medications? Is so, why? -  no    Have you seen any other physician or provider since your last visit? No  Have you had any other diagnostic tests since your last visit? No  Have you been seen in the emergency room and/or had an admission to a hospital since we last saw you? No  Have you had your routine dental cleaning in the past 6 months? no    Have you activated your [employer] account? If not, what are your barriers?  Yes     Patient Care Team:  Brendolyn Rinne, MD as PCP - General (Internal Medicine)  Brendolyn Rinne, MD [facility]  Brant Montgomery MD as Consulting Physician (Dermatology)  Mac Chang MD as Consulting Physician (Urology)  Carlos Eduardo Woodruff as Consulting Physician (Colon and Rectal Surgery)  Sharon Galaviz MD as Consulting Physician (Orthopedic Surgery)  Clayton Pulido MD as Consulting Physician (Dermatology)    Medical History Review  Past Medical, Family, and Social History reviewed and does not contribute to the patient presenting condition    Health Maintenance   Topic Date Due    Cervical cancer screen  09/13/2022    COVID-19 Vaccine (2 - Jarrett Daubs series) 10/05/2022    Depression Screen  03/06/2024    Annual Wellness Visit (AWV)  03/13/2024    Breast cancer screen  08/09/2024    Colorectal Cancer Screen  11/16/2025    Lipids  03/22/2028    DTaP/Tdap/Td vaccine (3 - Td or Tdap) 08/21/2028    DEXA (modify frequency per FRAX score)  Completed    Flu vaccine  Completed    Shingles vaccine  Completed    Pneumococcal 65+ years Vaccine  Completed    Hepatitis C screen  Completed    HIV screen  Completed    Hepatitis A vaccine  Aged Out    Hib vaccine  Aged Out    Meningococcal (ACWY) vaccine  Aged Out    Pneumococcal

## 2023-04-27 ENCOUNTER — TELEPHONE (OUTPATIENT)
Dept: INTERNAL MEDICINE CLINIC | Age: 68
End: 2023-04-27

## 2023-04-27 NOTE — TELEPHONE ENCOUNTER
Patient called in the clarify the directions on the script for the eye drops, informed patient of directions on script and she voiced understanding.

## 2023-06-01 ENCOUNTER — HOSPITAL ENCOUNTER (OUTPATIENT)
Age: 68
Setting detail: SPECIMEN
Discharge: HOME | End: 2023-06-01

## 2023-06-01 ENCOUNTER — OFFICE VISIT (OUTPATIENT)
Dept: OBGYN CLINIC | Age: 68
End: 2023-06-01
Payer: MEDICARE

## 2023-06-01 VITALS
SYSTOLIC BLOOD PRESSURE: 138 MMHG | HEIGHT: 67 IN | DIASTOLIC BLOOD PRESSURE: 70 MMHG | BODY MASS INDEX: 30.29 KG/M2 | WEIGHT: 193 LBS

## 2023-06-01 DIAGNOSIS — Z01.419 VISIT FOR GYNECOLOGIC EXAMINATION: Primary | ICD-10-CM

## 2023-06-01 DIAGNOSIS — Z12.31 ENCOUNTER FOR SCREENING MAMMOGRAM FOR MALIGNANT NEOPLASM OF BREAST: ICD-10-CM

## 2023-06-01 DIAGNOSIS — Z11.51 SCREENING FOR HPV (HUMAN PAPILLOMAVIRUS): ICD-10-CM

## 2023-06-01 PROCEDURE — G8417 CALC BMI ABV UP PARAM F/U: HCPCS | Performed by: OBSTETRICS & GYNECOLOGY

## 2023-06-01 PROCEDURE — G8427 DOCREV CUR MEDS BY ELIG CLIN: HCPCS | Performed by: OBSTETRICS & GYNECOLOGY

## 2023-06-01 PROCEDURE — G0101 CA SCREEN;PELVIC/BREAST EXAM: HCPCS | Performed by: OBSTETRICS & GYNECOLOGY

## 2023-06-01 NOTE — PROGRESS NOTES
History and Physical  [address], [address] [facility] [address] [phone]   Fax [phone]  Suly Latham  2023              74 y.o. Chief Complaint   Patient presents with    Annual Exam       Patient's last menstrual period was 2014. Primary Care Physician: Kati Leger MD    The patient was seen and examined. She has no chief complaint today and is here for her annual exam.  Her bowels are regular. There are no voiding complaints. She denies any bloating. She denies vaginal discharge and was counseled on STD's and the need for barrier contraception. HPI : Suly Latham is a 74 y.o. female [id number]    Pt presents to office for annual exam. Pt without c/c  no Bloating  no Early Satiety  no Unexplained weight change of more than 15 lbs  no  PMB  no  PCB  ________________________________________________________________________  OB History    Para Term  AB Living   4 3 3 0 1 3   SAB IAB Ectopic Molar Multiple Live Births   1 0 0 0 0 3      # Outcome Date GA Lbr Guerrero/2nd Weight Sex Delivery Anes PTL Lv   4 Term      Vag-Spont   ZAIDA   3 SAB               Birth Comments: blighted   2 Term      Vag-Spont   ZAIDA   1 Term      Vag-Spont   ZAIDA     Past Medical History:   Diagnosis Date    Arthritis     rt. hand, back.     Cancer ([address]     skin (basal cell)    History of renal calculi     Hyperglycemia 2018    Other chronic cystitis with hematuria     saw Dr. Cronin Hull    Plantar fasciitis     right > left saw Dr. Nereida Mcneill    Right carpal tunnel syndrome     per Dr. Carlos Almodovar, is [facility] splint [facility]    Wears glasses                                                                    Past Surgical History:   Procedure Laterality Date    CARPAL TUNNEL RELEASE Right 2020    CARPAL TUNNEL RELEASE performed by Snehal Phoenix MD at [address] [place] [address]  10/2015    posterior scalp    LIPOMA

## 2023-06-05 LAB
HPV SAMPLE: NORMAL
HPV, GENOTYPE 16: NOT DETECTED
HPV, GENOTYPE 18: NOT DETECTED
HPV, HIGH RISK OTHER: NOT DETECTED
HPV, INTERPRETATION: NORMAL
SPECIMEN DESCRIPTION: NORMAL

## 2023-06-07 LAB — CYTOLOGY REPORT: NORMAL

## 2023-08-10 ENCOUNTER — HOSPITAL ENCOUNTER (OUTPATIENT)
Dept: WOMENS IMAGING | Age: 68
Discharge: HOME | End: 2023-08-12
Attending: OBSTETRICS & GYNECOLOGY
Payer: MEDICARE

## 2023-08-10 DIAGNOSIS — Z12.31 ENCOUNTER FOR SCREENING MAMMOGRAM FOR MALIGNANT NEOPLASM OF BREAST: ICD-10-CM

## 2023-08-10 PROCEDURE — 77063 BREAST TOMOSYNTHESIS BI: CPT

## 2023-12-27 ENCOUNTER — TELEPHONE (OUTPATIENT)
Dept: ORTHOPEDIC SURGERY | Age: 68
End: 2023-12-27

## 2023-12-27 NOTE — TELEPHONE ENCOUNTER
Patient wishes to opt out of surgery at this time, however she would like to know if a brace will help, if so which brace do you recommend?      ----- Message from Tank Wei MD sent at 12/26/2023  9:03 AM EST -----  Partial tearing of the posterior horn of the meniscus medially.   Consider arthroscopic invention if symptoms persist  ----- Message -----  From: Claudia Panda Incoming Radiant Results From [employer]  Sent: 12/22/2023   1:26 PM EST  To: Tank Wei MD

## 2024-02-16 ENCOUNTER — HOSPITAL ENCOUNTER (OUTPATIENT)
Age: 69
Setting detail: SPECIMEN
Discharge: HOME | End: 2024-02-16

## 2024-02-16 ENCOUNTER — HOSPITAL ENCOUNTER (OUTPATIENT)
Dept: CT IMAGING | Facility: CLINIC | Age: 69
End: 2024-02-16
Payer: MEDICARE

## 2024-02-16 ENCOUNTER — TELEPHONE (OUTPATIENT)
Dept: INTERNAL MEDICINE CLINIC | Age: 69
End: 2024-02-16

## 2024-02-16 ENCOUNTER — OFFICE VISIT (OUTPATIENT)
Dept: FAMILY MEDICINE CLINIC | Age: 69
End: 2024-02-16

## 2024-02-16 VITALS
DIASTOLIC BLOOD PRESSURE: 75 MMHG | TEMPERATURE: 97.1 F | OXYGEN SATURATION: 100 % | SYSTOLIC BLOOD PRESSURE: 150 MMHG | HEART RATE: 75 BPM

## 2024-02-16 DIAGNOSIS — M54.50 ACUTE LEFT-SIDED LOW BACK PAIN WITHOUT SCIATICA: ICD-10-CM

## 2024-02-16 DIAGNOSIS — R31.29 OTHER MICROSCOPIC HEMATURIA: ICD-10-CM

## 2024-02-16 DIAGNOSIS — J01.90 ACUTE BACTERIAL SINUSITIS: Primary | ICD-10-CM

## 2024-02-16 DIAGNOSIS — Z87.442 HISTORY OF KIDNEY STONES: ICD-10-CM

## 2024-02-16 DIAGNOSIS — B96.89 ACUTE BACTERIAL SINUSITIS: Primary | ICD-10-CM

## 2024-02-16 LAB
BILIRUBIN, POC: ABNORMAL
BLOOD URINE, POC: ABNORMAL
CLARITY, POC: CLEAR
COLOR, POC: YELLOW
GLUCOSE URINE, POC: ABNORMAL
KETONES, POC: ABNORMAL
LEUKOCYTE EST, POC: ABNORMAL
NITRITE, POC: ABNORMAL
PH, POC: 7
PROTEIN, POC: ABNORMAL
SPECIFIC GRAVITY, POC: 1.02
UROBILINOGEN, POC: 0.2

## 2024-02-16 PROCEDURE — 74176 CT ABD & PELVIS W/O CONTRAST: CPT

## 2024-02-16 RX ORDER — TIZANIDINE 2 MG/1
2 TABLET ORAL NIGHTLY PRN
Qty: 10 TABLET | Refills: 0 | Status: SHIPPED | OUTPATIENT
Start: 2024-02-16 | End: 2024-02-16

## 2024-02-16 RX ORDER — AZITHROMYCIN 250 MG/1
TABLET, FILM COATED ORAL
Qty: 6 TABLET | Refills: 0 | Status: SHIPPED | OUTPATIENT
Start: 2024-02-16 | End: 2024-02-16

## 2024-02-16 RX ORDER — AZITHROMYCIN 250 MG/1
TABLET, FILM COATED ORAL
Qty: 6 TABLET | Refills: 0 | Status: SHIPPED | OUTPATIENT
Start: 2024-02-16 | End: 2024-02-26

## 2024-02-16 RX ORDER — TIZANIDINE 2 MG/1
2 TABLET ORAL NIGHTLY PRN
Qty: 10 TABLET | Refills: 0 | Status: SHIPPED | OUTPATIENT
Start: 2024-02-16 | End: 2024-02-26

## 2024-02-16 NOTE — PROGRESS NOTES
supple. No rigidity or tenderness.      Lumbar back: Spasms and tenderness present. No swelling, edema, deformity, signs of trauma, lacerations or bony tenderness. Normal range of motion. Negative right straight leg raise test and negative left straight leg raise test. No scoliosis.        Back:       Right lower leg: No edema.      Left lower leg: No edema.   Lymphadenopathy:      Cervical: No cervical adenopathy.   Skin:     General: Skin is warm and dry.      Findings: No erythema or rash.   Neurological:      Mental Status: She is alert and oriented to person, place, and time.      Motor: No weakness.      Coordination: Coordination normal.      Gait: Gait normal.   Psychiatric:         Mood and Affect: Mood normal.         Behavior: Behavior normal. Behavior is cooperative.         Thought Content: Thought content normal.         Judgment: Judgment normal.       BP (!) 150/75   Pulse 75   Temp 97.1 °F (36.2 °C) (Infrared)   LMP 11/12/2014   SpO2 100%     Assessment:       Diagnosis Orders   1. Acute bacterial sinusitis  azithromycin (ZITHROMAX) 250 MG tablet    DISCONTINUED: azithromycin (ZITHROMAX) 250 MG tablet      2. Acute left-sided low back pain without sciatica  POCT Urinalysis No Micro (Auto)    CT ABDOMEN PELVIS WO CONTRAST Additional Contrast? None    tiZANidine (ZANAFLEX) 2 MG tablet    DISCONTINUED: tiZANidine (ZANAFLEX) 2 MG tablet      3. Other microscopic hematuria  Culture, Urine    CT ABDOMEN PELVIS WO CONTRAST Additional Contrast? None      4. History of kidney stones  CT ABDOMEN PELVIS WO CONTRAST Additional Contrast? None          Lab Results   Component Value Date    WBC 3.8 03/25/2021    HGB 12.6 03/25/2021    HCT 38.1 03/25/2021    MCV 86.0 03/25/2021     03/25/2021     Lab Results   Component Value Date     03/25/2021    K 4.3 03/25/2021     03/25/2021    CO2 27 03/25/2021    BUN 16 03/25/2021    CREATININE 0.60 03/25/2021    GLUCOSE 92 03/25/2021    CALCIUM 9.5

## 2024-02-16 NOTE — TELEPHONE ENCOUNTER
Patient called wanting a same day appointment for possible kidney stones. No  availability. Advised patient to go to Walk-In.

## 2024-02-17 LAB
MICROORGANISM SPEC CULT: NORMAL
SPECIMEN DESCRIPTION: NORMAL

## 2024-06-03 SDOH — ECONOMIC STABILITY: FOOD INSECURITY: WITHIN THE PAST 12 MONTHS, YOU WORRIED THAT YOUR FOOD WOULD RUN OUT BEFORE YOU GOT MONEY TO BUY MORE.: NEVER TRUE

## 2024-06-03 SDOH — ECONOMIC STABILITY: TRANSPORTATION INSECURITY
IN THE PAST 12 MONTHS, HAS LACK OF TRANSPORTATION KEPT YOU FROM MEETINGS, WORK, OR FROM GETTING THINGS NEEDED FOR DAILY LIVING?: NO

## 2024-06-03 SDOH — HEALTH STABILITY: PHYSICAL HEALTH: ON AVERAGE, HOW MANY DAYS PER WEEK DO YOU ENGAGE IN MODERATE TO STRENUOUS EXERCISE (LIKE A BRISK WALK)?: 4 DAYS

## 2024-06-03 SDOH — ECONOMIC STABILITY: INCOME INSECURITY: HOW HARD IS IT FOR YOU TO PAY FOR THE VERY BASICS LIKE FOOD, HOUSING, MEDICAL CARE, AND HEATING?: NOT HARD AT ALL

## 2024-06-03 SDOH — ECONOMIC STABILITY: FOOD INSECURITY: WITHIN THE PAST 12 MONTHS, THE FOOD YOU BOUGHT JUST DIDN'T LAST AND YOU DIDN'T HAVE MONEY TO GET MORE.: NEVER TRUE

## 2024-06-03 SDOH — HEALTH STABILITY: PHYSICAL HEALTH: ON AVERAGE, HOW MANY MINUTES DO YOU ENGAGE IN EXERCISE AT THIS LEVEL?: 50 MIN

## 2024-06-03 ASSESSMENT — PATIENT HEALTH QUESTIONNAIRE - PHQ9
SUM OF ALL RESPONSES TO PHQ QUESTIONS 1-9: 0
SUM OF ALL RESPONSES TO PHQ QUESTIONS 1-9: 0
1. LITTLE INTEREST OR PLEASURE IN DOING THINGS: NOT AT ALL
SUM OF ALL RESPONSES TO PHQ QUESTIONS 1-9: 0
2. FEELING DOWN, DEPRESSED OR HOPELESS: NOT AT ALL
SUM OF ALL RESPONSES TO PHQ9 QUESTIONS 1 & 2: 0
SUM OF ALL RESPONSES TO PHQ QUESTIONS 1-9: 0

## 2024-06-03 ASSESSMENT — LIFESTYLE VARIABLES
HOW MANY STANDARD DRINKS CONTAINING ALCOHOL DO YOU HAVE ON A TYPICAL DAY: 0
HOW OFTEN DO YOU HAVE SIX OR MORE DRINKS ON ONE OCCASION: 1
HOW OFTEN DO YOU HAVE A DRINK CONTAINING ALCOHOL: NEVER
HOW OFTEN DO YOU HAVE A DRINK CONTAINING ALCOHOL: 1
HOW MANY STANDARD DRINKS CONTAINING ALCOHOL DO YOU HAVE ON A TYPICAL DAY: PATIENT DOES NOT DRINK

## 2024-06-04 ENCOUNTER — HOSPITAL ENCOUNTER (OUTPATIENT)
Age: 69
Setting detail: SPECIMEN
Discharge: HOME | End: 2024-06-04

## 2024-06-04 ENCOUNTER — OFFICE VISIT (OUTPATIENT)
Dept: INTERNAL MEDICINE CLINIC | Age: 69
End: 2024-06-04
Payer: MEDICARE

## 2024-06-04 VITALS
BODY MASS INDEX: 30.29 KG/M2 | WEIGHT: 193 LBS | SYSTOLIC BLOOD PRESSURE: 146 MMHG | HEIGHT: 67 IN | OXYGEN SATURATION: 98 % | DIASTOLIC BLOOD PRESSURE: 88 MMHG | HEART RATE: 67 BPM

## 2024-06-04 DIAGNOSIS — Z13.1 DIABETES MELLITUS SCREENING: ICD-10-CM

## 2024-06-04 DIAGNOSIS — Z13.220 LIPID SCREENING: ICD-10-CM

## 2024-06-04 DIAGNOSIS — R31.29 MICROSCOPIC HEMATURIA: ICD-10-CM

## 2024-06-04 DIAGNOSIS — I10 ESSENTIAL HYPERTENSION: ICD-10-CM

## 2024-06-04 DIAGNOSIS — R07.9 CHEST PAIN, UNSPECIFIED TYPE: ICD-10-CM

## 2024-06-04 DIAGNOSIS — Z00.00 INITIAL MEDICARE ANNUAL WELLNESS VISIT: Primary | ICD-10-CM

## 2024-06-04 LAB
ALBUMIN SERPL-MCNC: 4.4 G/DL (ref 3.5–5.2)
ALBUMIN/GLOB SERPL: 2 {RATIO} (ref 1–2.5)
ALP SERPL-CCNC: 79 U/L (ref 35–104)
ALT SERPL-CCNC: 17 U/L (ref 10–35)
ANION GAP SERPL CALCULATED.3IONS-SCNC: 9 MMOL/L (ref 9–16)
AST SERPL-CCNC: 22 U/L (ref 10–35)
BACTERIA URNS QL MICRO: ABNORMAL
BILIRUB SERPL-MCNC: 0.2 MG/DL (ref 0–1.2)
BILIRUB UR QL STRIP: NEGATIVE
BUN SERPL-MCNC: 14 MG/DL (ref 8–23)
CALCIUM SERPL-MCNC: 9.4 MG/DL (ref 8.6–10.4)
CASTS #/AREA URNS LPF: ABNORMAL /LPF (ref 0–8)
CHLORIDE SERPL-SCNC: 104 MMOL/L (ref 98–107)
CHOLEST SERPL-MCNC: 188 MG/DL (ref 0–199)
CHOLESTEROL/HDL RATIO: 3
CLARITY UR: CLEAR
CO2 SERPL-SCNC: 26 MMOL/L (ref 20–31)
COLOR UR: YELLOW
CREAT SERPL-MCNC: 0.8 MG/DL (ref 0.5–0.9)
EPI CELLS #/AREA URNS HPF: ABNORMAL /HPF (ref 0–5)
ERYTHROCYTE [DISTWIDTH] IN BLOOD BY AUTOMATED COUNT: 13.1 % (ref 11.8–14.4)
GFR, ESTIMATED: 86 ML/MIN/1.73M2
GLUCOSE P FAST SERPL-MCNC: 99 MG/DL (ref 74–99)
GLUCOSE SERPL-MCNC: 99 MG/DL (ref 74–99)
GLUCOSE UR STRIP-MCNC: NEGATIVE MG/DL
HCT VFR BLD AUTO: 38.5 % (ref 36.3–47.1)
HDLC SERPL-MCNC: 68 MG/DL
HGB BLD-MCNC: 12.4 G/DL (ref 11.9–15.1)
HGB UR QL STRIP.AUTO: NEGATIVE
KETONES UR STRIP-MCNC: NEGATIVE MG/DL
LDLC SERPL CALC-MCNC: 106 MG/DL (ref 0–100)
LEUKOCYTE ESTERASE UR QL STRIP: ABNORMAL
MCH RBC QN AUTO: 27.5 PG (ref 25.2–33.5)
MCHC RBC AUTO-ENTMCNC: 32.2 G/DL (ref 28.4–34.8)
MCV RBC AUTO: 85.4 FL (ref 82.6–102.9)
NITRITE UR QL STRIP: NEGATIVE
NRBC BLD-RTO: 0 PER 100 WBC
PH UR STRIP: 6.5 [PH] (ref 5–8)
PLATELET # BLD AUTO: 192 K/UL (ref 138–453)
PMV BLD AUTO: 9.4 FL (ref 8.1–13.5)
POTASSIUM SERPL-SCNC: 4.4 MMOL/L (ref 3.7–5.3)
PROT SERPL-MCNC: 7.1 G/DL (ref 6.6–8.7)
PROT UR STRIP-MCNC: NEGATIVE MG/DL
RBC # BLD AUTO: 4.51 M/UL (ref 3.95–5.11)
RBC #/AREA URNS HPF: ABNORMAL /HPF (ref 0–4)
SODIUM SERPL-SCNC: 139 MMOL/L (ref 136–145)
SP GR UR STRIP: 1.02 (ref 1–1.03)
TRIGL SERPL-MCNC: 75 MG/DL (ref 0–149)
UROBILINOGEN UR STRIP-ACNC: NORMAL EU/DL (ref 0–1)
VLDLC SERPL CALC-MCNC: 15 MG/DL
WBC #/AREA URNS HPF: ABNORMAL /HPF (ref 0–5)
WBC OTHER # BLD: 3.6 K/UL (ref 3.5–11.3)

## 2024-06-04 PROCEDURE — 3017F COLORECTAL CA SCREEN DOC REV: CPT | Performed by: INTERNAL MEDICINE

## 2024-06-04 PROCEDURE — G0438 PPPS, INITIAL VISIT: HCPCS | Performed by: INTERNAL MEDICINE

## 2024-06-04 PROCEDURE — G8399 PT W/DXA RESULTS DOCUMENT: HCPCS | Performed by: INTERNAL MEDICINE

## 2024-06-04 PROCEDURE — 1123F ACP DISCUSS/DSCN MKR DOCD: CPT | Performed by: INTERNAL MEDICINE

## 2024-06-04 PROCEDURE — G8417 CALC BMI ABV UP PARAM F/U: HCPCS | Performed by: INTERNAL MEDICINE

## 2024-06-04 PROCEDURE — 1036F TOBACCO NON-USER: CPT | Performed by: INTERNAL MEDICINE

## 2024-06-04 PROCEDURE — 3079F DIAST BP 80-89 MM HG: CPT | Performed by: INTERNAL MEDICINE

## 2024-06-04 PROCEDURE — 3077F SYST BP >= 140 MM HG: CPT | Performed by: INTERNAL MEDICINE

## 2024-06-04 PROCEDURE — G8427 DOCREV CUR MEDS BY ELIG CLIN: HCPCS | Performed by: INTERNAL MEDICINE

## 2024-06-04 PROCEDURE — 1090F PRES/ABSN URINE INCON ASSESS: CPT | Performed by: INTERNAL MEDICINE

## 2024-06-04 PROCEDURE — 99214 OFFICE O/P EST MOD 30 MIN: CPT | Performed by: INTERNAL MEDICINE

## 2024-06-04 RX ORDER — AMLODIPINE BESYLATE 5 MG/1
5 TABLET ORAL DAILY
Qty: 90 TABLET | Refills: 0 | Status: SHIPPED | OUTPATIENT
Start: 2024-06-04

## 2024-06-04 NOTE — PROGRESS NOTES
murmur  Abdomen:  soft, nontender, nondistended, normal bowel sounds, no masses,   Extremities:  no  edema, redness, tenderness in the calves  Skin:  no gross lesions, rashes, induration  Neuro:  Alert, oriented X 3, Gait normal. Non-focal.          No Known Allergies  Prior to Visit Medications    Not on File       CareTeam (Including outside providers/suppliers regularly involved in providing care):   Patient Care Team:  Lindsey Jones MD as PCP - General (Internal Medicine)  Lindsey Jones MD as PCP - Empaneled Provider  Flavia Cruz MD as Consulting Physician (Dermatology)  Dick Poole MD as Consulting Physician (Urology)  Marilin Mendez MD as Consulting Physician (Colon and Rectal Surgery)  Les Fonseca MD as Consulting Physician (Orthopedic Surgery)  Claudine Lomeli MD as Consulting Physician (Dermatology)     Reviewed and updated this visit:  Tobacco  Allergies  Meds  Med Hx  Surg Hx  Soc Hx  Fam Hx

## 2024-06-04 NOTE — RESULT ENCOUNTER NOTE
No blood in the urine.  Cholesterol slightly on the higher side recommend diet and exercise no need for medication at the moment-to be discussed further at next office visit  Other lab results satisfactory

## 2024-06-04 NOTE — PATIENT INSTRUCTIONS
back, neck, jaw, or upper belly or in one or both shoulders or arms.     Lightheadedness or sudden weakness.     A fast or irregular heartbeat.   After you call 911, the  may tell you to chew 1 adult-strength or 2 to 4 low-dose aspirin. Wait for an ambulance. Do not try to drive yourself.  Watch closely for changes in your health, and be sure to contact your doctor if you have any problems.  Where can you learn more?  Go to https://www.[employer].net/patientEd and enter F075 to learn more about \"A Healthy Heart: Care Instructions.\"  Current as of: June 24, 2023               Content Version: 14.0  © 6076-4888 [employer].   Care instructions adapted under license by [employer]. If you have questions about a medical condition or this instruction, always ask your healthcare professional. [employer] disclaims any warranty or liability for your use of this information.      Personalized Preventive Plan for Toya Pina - 6/4/2024  Medicare offers a range of preventive health benefits. Some of the tests and screenings are paid in full while other may be subject to a deductible, co-insurance, and/or copay.    Some of these benefits include a comprehensive review of your medical history including lifestyle, illnesses that may run in your family, and various assessments and screenings as appropriate.    After reviewing your medical record and screening and assessments performed today your provider may have ordered immunizations, labs, imaging, and/or referrals for you.  A list of these orders (if applicable) as well as your Preventive Care list are included within your After Visit Summary for your review.    Other Preventive Recommendations:    A preventive eye exam performed by an eye specialist is recommended every 1-2 years to screen for glaucoma; cataracts, macular degeneration, and other eye disorders.  A preventive dental visit is recommended every 6 months.  Try to get at

## 2024-07-05 ENCOUNTER — HOSPITAL ENCOUNTER (OUTPATIENT)
Dept: NUCLEAR MEDICINE | Age: 69
End: 2024-07-05
Attending: INTERNAL MEDICINE
Payer: MEDICARE

## 2024-07-05 ENCOUNTER — TELEPHONE (OUTPATIENT)
Dept: INTERNAL MEDICINE CLINIC | Age: 69
End: 2024-07-05

## 2024-07-05 ENCOUNTER — HOSPITAL ENCOUNTER (OUTPATIENT)
Age: 69
End: 2024-07-05
Attending: INTERNAL MEDICINE
Payer: MEDICARE

## 2024-07-05 ENCOUNTER — HOSPITAL ENCOUNTER (OUTPATIENT)
Dept: NUCLEAR MEDICINE | Age: 69
Discharge: HOME | End: 2024-07-05
Attending: INTERNAL MEDICINE
Payer: MEDICARE

## 2024-07-05 DIAGNOSIS — R07.9 CHEST PAIN, UNSPECIFIED TYPE: ICD-10-CM

## 2024-07-05 LAB
STRESS BASELINE DIAS BP: 70 MMHG
STRESS BASELINE HR: 66 BPM
STRESS BASELINE SYS BP: 153 MMHG
STRESS ESTIMATED WORKLOAD: 1 METS
STRESS PEAK DIAS BP: 81 MMHG
STRESS PEAK SYS BP: 176 MMHG
STRESS PERCENT HR ACHIEVED: 65 %
STRESS POST PEAK HR: 100 BPM
STRESS RATE PRESSURE PRODUCT: NORMAL BPM*MMHG
STRESS STAGE RECOVERY 1 BP: NORMAL MMHG
STRESS STAGE RECOVERY 1 DURATION: 1 MIN:SEC
STRESS STAGE RECOVERY 1 HR: 87 BPM
STRESS STAGE RECOVERY 2 BP: NORMAL MMHG
STRESS STAGE RECOVERY 2 DURATION: 9 MIN:SEC
STRESS STAGE RECOVERY 2 HR: 77 BPM
STRESS TARGET HR: 153 BPM

## 2024-07-05 PROCEDURE — 6360000002 HC RX W HCPCS: Performed by: INTERNAL MEDICINE

## 2024-07-05 PROCEDURE — A9500 TC99M SESTAMIBI: HCPCS | Performed by: INTERNAL MEDICINE

## 2024-07-05 PROCEDURE — 78452 HT MUSCLE IMAGE SPECT MULT: CPT

## 2024-07-05 PROCEDURE — 93017 CV STRESS TEST TRACING ONLY: CPT

## 2024-07-05 PROCEDURE — 2580000003 HC RX 258: Performed by: INTERNAL MEDICINE

## 2024-07-05 PROCEDURE — 3430000000 HC RX DIAGNOSTIC RADIOPHARMACEUTICAL: Performed by: INTERNAL MEDICINE

## 2024-07-05 RX ORDER — METOPROLOL TARTRATE 1 MG/ML
5 INJECTION, SOLUTION INTRAVENOUS EVERY 5 MIN PRN
Status: ACTIVE | OUTPATIENT
Start: 2024-07-05 | End: 2024-07-05

## 2024-07-05 RX ORDER — SODIUM CHLORIDE 9 MG/ML
500 INJECTION, SOLUTION INTRAVENOUS CONTINUOUS PRN
Status: ACTIVE | OUTPATIENT
Start: 2024-07-05 | End: 2024-07-05

## 2024-07-05 RX ORDER — ALBUTEROL SULFATE 90 UG/1
2 AEROSOL, METERED RESPIRATORY (INHALATION) PRN
Status: ACTIVE | OUTPATIENT
Start: 2024-07-05 | End: 2024-07-05

## 2024-07-05 RX ORDER — SODIUM CHLORIDE 0.9 % (FLUSH) 0.9 %
5-40 SYRINGE (ML) INJECTION PRN
Status: ACTIVE | OUTPATIENT
Start: 2024-07-05 | End: 2024-07-05

## 2024-07-05 RX ORDER — SODIUM CHLORIDE 0.9 % (FLUSH) 0.9 %
10 SYRINGE (ML) INJECTION PRN
Status: DISCONTINUED | OUTPATIENT
Start: 2024-07-05 | End: 2024-07-08 | Stop reason: HOSPADM

## 2024-07-05 RX ORDER — AMINOPHYLLINE 25 MG/ML
50 INJECTION, SOLUTION INTRAVENOUS PRN
Status: ACTIVE | OUTPATIENT
Start: 2024-07-05 | End: 2024-07-05

## 2024-07-05 RX ORDER — ATROPINE SULFATE 0.1 MG/ML
0.5 INJECTION INTRAVENOUS EVERY 5 MIN PRN
Status: ACTIVE | OUTPATIENT
Start: 2024-07-05 | End: 2024-07-05

## 2024-07-05 RX ORDER — TETRAKIS(2-METHOXYISOBUTYLISOCYANIDE)COPPER(I) TETRAFLUOROBORATE 1 MG/ML
10 INJECTION, POWDER, LYOPHILIZED, FOR SOLUTION INTRAVENOUS
Status: COMPLETED | OUTPATIENT
Start: 2024-07-05 | End: 2024-07-05

## 2024-07-05 RX ORDER — REGADENOSON 0.08 MG/ML
0.4 INJECTION, SOLUTION INTRAVENOUS
Status: COMPLETED | OUTPATIENT
Start: 2024-07-05 | End: 2024-07-05

## 2024-07-05 RX ORDER — TETRAKIS(2-METHOXYISOBUTYLISOCYANIDE)COPPER(I) TETRAFLUOROBORATE 1 MG/ML
30 INJECTION, POWDER, LYOPHILIZED, FOR SOLUTION INTRAVENOUS
Status: COMPLETED | OUTPATIENT
Start: 2024-07-05 | End: 2024-07-05

## 2024-07-05 RX ORDER — NITROGLYCERIN 0.4 MG/1
0.4 TABLET SUBLINGUAL EVERY 5 MIN PRN
Status: ACTIVE | OUTPATIENT
Start: 2024-07-05 | End: 2024-07-05

## 2024-07-05 RX ADMIN — Medication 36.7 MILLICURIE: at 09:00

## 2024-07-05 RX ADMIN — REGADENOSON 0.4 MG: 0.08 INJECTION, SOLUTION INTRAVENOUS at 08:58

## 2024-07-05 RX ADMIN — SODIUM CHLORIDE, PRESERVATIVE FREE 10 ML: 5 INJECTION INTRAVENOUS at 07:10

## 2024-07-05 RX ADMIN — Medication 10.8 MILLICURIE: at 07:09

## 2024-07-31 ENCOUNTER — OFFICE VISIT (OUTPATIENT)
Dept: INTERNAL MEDICINE CLINIC | Age: 69
End: 2024-07-31

## 2024-07-31 VITALS
DIASTOLIC BLOOD PRESSURE: 76 MMHG | BODY MASS INDEX: 29.66 KG/M2 | HEART RATE: 84 BPM | OXYGEN SATURATION: 97 % | WEIGHT: 189 LBS | SYSTOLIC BLOOD PRESSURE: 124 MMHG | HEIGHT: 67 IN

## 2024-07-31 DIAGNOSIS — I10 ESSENTIAL HYPERTENSION: ICD-10-CM

## 2024-07-31 RX ORDER — AMLODIPINE BESYLATE 5 MG/1
5 TABLET ORAL DAILY
Qty: 90 TABLET | Refills: 3 | Status: SHIPPED | OUTPATIENT
Start: 2024-07-31

## 2024-07-31 NOTE — PROGRESS NOTES
distress  Head - Atraumatic, normocephalic  Eyes - EOMI, no jaundice or pallor  Lungs - Lungs clear to auscultation.  No wheezing, rhonchi, rales  Heart - RRR without murmur, gallop, or rubs.  No ectopy  Abdomen - Abdomen soft, non-tender. Bowel sounds normal. No masses, organomegaly  Extremities -No significant edema, or skin discoloration. Good capillary refill.  Neuro - Pt Alert, awake and oriented x 3. No gross focal neurological deficits    ASSESSMENT AND PLAN (MEDICAL DECISION MAKING):    Toya was seen today for results and hypertension.    Diagnoses and all orders for this visit:    Essential hypertension  Comments:  controlled  c/w norvasc  Orders:  -     amLODIPine (NORVASC) 5 MG tablet; Take 1 tablet by mouth daily    Essential hypertension  Comments:  start norvasc  Orders:  -     amLODIPine (NORVASC) 5 MG tablet; Take 1 tablet by mouth daily             Follow up in: 3mth       Lindsey Jones MD

## 2024-08-12 ENCOUNTER — HOSPITAL ENCOUNTER (OUTPATIENT)
Dept: WOMENS IMAGING | Age: 69
Discharge: HOME | End: 2024-08-14
Payer: MEDICARE

## 2024-08-12 DIAGNOSIS — Z12.31 OTHER SCREENING MAMMOGRAM: ICD-10-CM

## 2024-08-12 PROCEDURE — 77063 BREAST TOMOSYNTHESIS BI: CPT

## 2024-11-04 ENCOUNTER — OFFICE VISIT (OUTPATIENT)
Dept: INTERNAL MEDICINE CLINIC | Age: 69
End: 2024-11-04

## 2024-11-04 VITALS
HEART RATE: 78 BPM | DIASTOLIC BLOOD PRESSURE: 76 MMHG | BODY MASS INDEX: 30.76 KG/M2 | OXYGEN SATURATION: 98 % | HEIGHT: 67 IN | WEIGHT: 196 LBS | SYSTOLIC BLOOD PRESSURE: 128 MMHG

## 2024-11-04 DIAGNOSIS — I10 ESSENTIAL HYPERTENSION: Primary | ICD-10-CM

## 2024-11-04 RX ORDER — AMLODIPINE BESYLATE 10 MG/1
10 TABLET ORAL DAILY
Qty: 30 TABLET | Refills: 3 | Status: SHIPPED | OUTPATIENT
Start: 2024-11-04 | End: 2024-11-04

## 2024-11-04 RX ORDER — AMLODIPINE BESYLATE 10 MG/1
10 TABLET ORAL DAILY
Qty: 90 TABLET | Refills: 1 | Status: SHIPPED | OUTPATIENT
Start: 2024-11-04

## 2024-11-04 NOTE — PROGRESS NOTES
\"Have you been to the ER, urgent care clinic since your last visit?  Hospitalized since your last visit?\"    no    SUBJECTIVE:  Toya Pina is a 69 y.o. female patient who  comes for complaints of   Chief Complaint   Patient presents with    Hypertension           Hypertension   Patient indicates that s/he is feeling well and denies any symptoms referable to elevated blood pressure.   - Specifically denies headache, chest pain, palpitations, dyspnea and peripheral edema.  - Patient denies any side effects of their medication(s) and is compliant with their regimen.      - Watches his/her diet for sodium, low fat and low cholesterol most of the time.  Last 3 Encounter BP Readings  BP Readings from Last 3 Encounters:   11/04/24 128/76   07/31/24 124/76   06/04/24 (!) 146/88       Is getting several higher readings 150-160s at home you are  Increase norvasc to 10mg daily        Alcohol/smoking- none    REVIEW OF SYSTEMS (except Subjective (HPI))  GENERAL: No fevers / chills  RESPIRATORY: Negative for cough, wheezing or shortness of breath  CARDIOVASCULAR: Negative for chest pain or palpitations.  GI: no nausea, vomiting, or diarrhea  NEURO: No history of headaches    Past Medical History:   Diagnosis Date    Arthritis     rt. hand, back.    Cancer (HCC)     skin (basal cell)    History of renal calculi 11/14    Hyperglycemia 8/17/2018    Other chronic cystitis with hematuria     saw Dr. Poole    Plantar fasciitis 11/14    right > left saw Dr. Malik    Right carpal tunnel syndrome     per Dr. Fonseca, is wearing splint at HS    Wears glasses        SOCIAL HISTORY:  Social History     Socioeconomic History    Marital status:      Spouse name: Not on file    Number of children: Not on file    Years of education: Not on file    Highest education level: Not on file   Occupational History    Not on file   Tobacco Use    Smoking status: Never    Smokeless tobacco: Never   Vaping Use    Vaping status: Never Used

## 2025-02-11 SDOH — HEALTH STABILITY: PHYSICAL HEALTH: ON AVERAGE, HOW MANY MINUTES DO YOU ENGAGE IN EXERCISE AT THIS LEVEL?: 40 MIN

## 2025-02-11 SDOH — HEALTH STABILITY: PHYSICAL HEALTH: ON AVERAGE, HOW MANY DAYS PER WEEK DO YOU ENGAGE IN MODERATE TO STRENUOUS EXERCISE (LIKE A BRISK WALK)?: 4 DAYS

## 2025-02-14 ENCOUNTER — OFFICE VISIT (OUTPATIENT)
Dept: INTERNAL MEDICINE CLINIC | Age: 70
End: 2025-02-14

## 2025-02-14 VITALS
HEIGHT: 67 IN | HEART RATE: 75 BPM | OXYGEN SATURATION: 96 % | DIASTOLIC BLOOD PRESSURE: 74 MMHG | WEIGHT: 196 LBS | BODY MASS INDEX: 30.76 KG/M2 | SYSTOLIC BLOOD PRESSURE: 134 MMHG

## 2025-02-14 DIAGNOSIS — I10 ESSENTIAL HYPERTENSION: ICD-10-CM

## 2025-02-14 RX ORDER — AMLODIPINE BESYLATE 10 MG/1
10 TABLET ORAL DAILY
Qty: 90 TABLET | Refills: 1 | Status: SHIPPED | OUTPATIENT
Start: 2025-02-14

## 2025-02-14 SDOH — ECONOMIC STABILITY: FOOD INSECURITY: WITHIN THE PAST 12 MONTHS, THE FOOD YOU BOUGHT JUST DIDN'T LAST AND YOU DIDN'T HAVE MONEY TO GET MORE.: PATIENT DECLINED

## 2025-02-14 SDOH — ECONOMIC STABILITY: FOOD INSECURITY: WITHIN THE PAST 12 MONTHS, YOU WORRIED THAT YOUR FOOD WOULD RUN OUT BEFORE YOU GOT MONEY TO BUY MORE.: PATIENT DECLINED

## 2025-02-14 ASSESSMENT — PATIENT HEALTH QUESTIONNAIRE - PHQ9
SUM OF ALL RESPONSES TO PHQ QUESTIONS 1-9: 0
SUM OF ALL RESPONSES TO PHQ QUESTIONS 1-9: 0
1. LITTLE INTEREST OR PLEASURE IN DOING THINGS: NOT AT ALL
SUM OF ALL RESPONSES TO PHQ QUESTIONS 1-9: 0
SUM OF ALL RESPONSES TO PHQ9 QUESTIONS 1 & 2: 0
SUM OF ALL RESPONSES TO PHQ QUESTIONS 1-9: 0
2. FEELING DOWN, DEPRESSED OR HOPELESS: NOT AT ALL

## 2025-02-14 NOTE — PROGRESS NOTES
\"Have you been to the ER, urgent care clinic since your last visit?  Hospitalized since your last visit?\"    no    SUBJECTIVE:  Toya Pina is a 69 y.o. female patient who  comes for complaints of   Chief Complaint   Patient presents with    Established New Doctor    Sinus Problem     Chronic issue           Hypertension   Patient indicates that s/he is feeling well and denies any symptoms referable to elevated blood pressure.   - Specifically denies headache, chest pain, palpitations, dyspnea and peripheral edema.  - Patient denies any side effects of their medication(s) and is compliant with their regimen.      - Watches his/her diet for sodium, low fat and low cholesterol most of the time.  Last 3 Encounter BP Readings  BP Readings from Last 3 Encounters:   02/14/25 134/74   11/04/24 128/76   07/31/24 124/76       BP stable   At home most of the 130s   On norvasc     Labs will do June 2025    Wants to loose weight   Advised to exercise and diiet     Alcohol/smoking- none      Some shoulder pain     REVIEW OF SYSTEMS (except Subjective (HPI))  GENERAL: No fevers / chills  RESPIRATORY: Negative for cough, wheezing or shortness of breath  CARDIOVASCULAR: Negative for chest pain or palpitations.  GI: no nausea, vomiting, or diarrhea  NEURO: No history of headaches    Past Medical History:   Diagnosis Date    Arthritis     rt. hand, back.    Cancer (HCC)     skin (basal cell)    History of renal calculi 11/14    Hyperglycemia 8/17/2018    Other chronic cystitis with hematuria     saw Dr. Poole    Plantar fasciitis 11/14    right > left saw Dr. Malik    Right carpal tunnel syndrome     per Dr. Fonseca, is wearing splint at HS    Wears glasses        SOCIAL HISTORY:  Social History     Socioeconomic History    Marital status:      Spouse name: Not on file    Number of children: Not on file    Years of education: Not on file    Highest education level: Not on file   Occupational History    Not on file

## 2025-05-08 ENCOUNTER — PATIENT MESSAGE (OUTPATIENT)
Dept: INTERNAL MEDICINE CLINIC | Age: 70
End: 2025-05-08

## 2025-05-08 DIAGNOSIS — I10 ESSENTIAL HYPERTENSION: ICD-10-CM

## 2025-05-08 RX ORDER — AMLODIPINE BESYLATE 10 MG/1
10 TABLET ORAL DAILY
Qty: 90 TABLET | Refills: 1 | Status: SHIPPED | OUTPATIENT
Start: 2025-05-08

## 2025-05-14 ENCOUNTER — PATIENT MESSAGE (OUTPATIENT)
Dept: INTERNAL MEDICINE CLINIC | Age: 70
End: 2025-05-14

## 2025-05-14 DIAGNOSIS — Z12.11 SCREEN FOR COLON CANCER: Primary | ICD-10-CM

## 2025-05-16 ENCOUNTER — TELEPHONE (OUTPATIENT)
Dept: GASTROENTEROLOGY | Age: 70
End: 2025-05-16

## 2025-05-21 ENCOUNTER — PREP FOR PROCEDURE (OUTPATIENT)
Dept: GASTROENTEROLOGY | Age: 70
End: 2025-05-21

## 2025-05-21 DIAGNOSIS — Z12.11 SCREENING FOR COLON CANCER: ICD-10-CM

## 2025-06-04 ENCOUNTER — OFFICE VISIT (OUTPATIENT)
Dept: OBGYN CLINIC | Age: 70
End: 2025-06-04
Payer: MEDICARE

## 2025-06-04 VITALS
SYSTOLIC BLOOD PRESSURE: 118 MMHG | DIASTOLIC BLOOD PRESSURE: 70 MMHG | HEIGHT: 67 IN | WEIGHT: 196 LBS | BODY MASS INDEX: 30.76 KG/M2

## 2025-06-04 DIAGNOSIS — Z78.0 POSTMENOPAUSAL: ICD-10-CM

## 2025-06-04 DIAGNOSIS — M85.80 OSTEOPENIA, UNSPECIFIED LOCATION: ICD-10-CM

## 2025-06-04 DIAGNOSIS — Z01.419 WELL FEMALE EXAM WITH ROUTINE GYNECOLOGICAL EXAM: Primary | ICD-10-CM

## 2025-06-04 DIAGNOSIS — Z12.31 ENCOUNTER FOR SCREENING MAMMOGRAM FOR MALIGNANT NEOPLASM OF BREAST: ICD-10-CM

## 2025-06-04 PROCEDURE — 1159F MED LIST DOCD IN RCRD: CPT | Performed by: NURSE PRACTITIONER

## 2025-06-04 PROCEDURE — G0101 CA SCREEN;PELVIC/BREAST EXAM: HCPCS | Performed by: NURSE PRACTITIONER

## 2025-06-04 PROCEDURE — 3074F SYST BP LT 130 MM HG: CPT | Performed by: NURSE PRACTITIONER

## 2025-06-04 PROCEDURE — 3078F DIAST BP <80 MM HG: CPT | Performed by: NURSE PRACTITIONER

## 2025-06-04 NOTE — PROGRESS NOTES
Orientation to: Time, Place, Person, and Situation  There is no Mood / Affect changes    Breast:  (Chest)  normal appearance, no masses or tenderness, Inspection negative, No nipple retraction or dimpling, No nipple discharge or bleeding, No axillary or supraclavicular adenopathy, Normal to palpation without dominant masses  Self breast exams were reviewed in detail. Literature was given.    Pelvic Exam:  External genitalia: normal general appearance  Urinary system: urethral meatus normal Bladder is smooth NT   Vaginal: cystocele present, grade 1 and rectocele present, grade 1  Cervix: normal appearance  Adnexa: normal bimanual exam  Uterus: normal single, nontender    Rectal Exam:  exam declined by patient           Musculosk:  Normal Gait and station was noted.  Digits were evaluated without abnormal findings.  Range of motion, stability and strength were evaluated and found to be appropriate for the patients age.        ASSESSMENT:      69 y.o. Annual   Diagnosis Orders   1. Well female exam with routine gynecological exam        2. Encounter for screening mammogram for malignant neoplasm of breast  [facility] DIGITAL SCREEN W OR WO CAD BILATERAL      3. Postmenopausal        4. Osteopenia, unspecified location               Chief Complaint   Patient presents with    Annual Exam          Past Medical History:   Diagnosis Date    Arthritis     rt. hand, back.    Cancer (HCC)     skin (basal cell)    History of renal calculi 11/14    Hyperglycemia 8/17/2018    Other chronic cystitis with hematuria     saw Dr. Poole    Plantar fasciitis 11/14    right > left saw Dr. Malik    Right carpal tunnel syndrome     per Dr. Fonseca, is wearing splint at HS    Wears glasses          Patient Active Problem List    Diagnosis Date Noted    Screening for colon cancer 05/21/2025    Essential hypertension 06/04/2024    Arthritis 02/18/2019    Chronic eczematous otitis externa of left ear 12/07/2017    Chronic midline low back pain

## 2025-06-20 PROBLEM — Z12.11 SCREENING FOR COLON CANCER: Status: RESOLVED | Noted: 2025-05-21 | Resolved: 2025-06-20

## 2025-07-01 ENCOUNTER — PATIENT MESSAGE (OUTPATIENT)
Dept: INTERNAL MEDICINE CLINIC | Age: 70
End: 2025-07-01

## 2025-08-04 DIAGNOSIS — I10 ESSENTIAL HYPERTENSION: ICD-10-CM

## 2025-08-04 RX ORDER — AMLODIPINE BESYLATE 10 MG/1
10 TABLET ORAL DAILY
Qty: 90 TABLET | Refills: 0 | Status: SHIPPED | OUTPATIENT
Start: 2025-08-04

## 2025-08-11 ENCOUNTER — OFFICE VISIT (OUTPATIENT)
Dept: ORTHOPEDIC SURGERY | Age: 70
End: 2025-08-11

## 2025-08-11 VITALS — RESPIRATION RATE: 14 BRPM | WEIGHT: 190 LBS | BODY MASS INDEX: 29.82 KG/M2 | HEIGHT: 67 IN

## 2025-08-11 DIAGNOSIS — G57.02 PIRIFORMIS SYNDROME, LEFT: ICD-10-CM

## 2025-08-11 DIAGNOSIS — M25.552 LEFT HIP PAIN: Primary | ICD-10-CM

## 2025-08-13 ENCOUNTER — HOSPITAL ENCOUNTER (OUTPATIENT)
Dept: WOMENS IMAGING | Age: 70
Discharge: HOME | End: 2025-08-15
Payer: MEDICARE

## 2025-08-13 DIAGNOSIS — Z12.31 ENCOUNTER FOR SCREENING MAMMOGRAM FOR MALIGNANT NEOPLASM OF BREAST: ICD-10-CM

## 2025-08-13 PROCEDURE — 77063 BREAST TOMOSYNTHESIS BI: CPT

## 2025-08-27 ENCOUNTER — APPOINTMENT (OUTPATIENT)
Dept: PHYSICAL THERAPY | Age: 70
End: 2025-08-27
Attending: STUDENT IN AN ORGANIZED HEALTH CARE EDUCATION/TRAINING PROGRAM
Payer: MEDICARE

## 2025-08-29 ENCOUNTER — HOSPITAL ENCOUNTER (OUTPATIENT)
Dept: PHYSICAL THERAPY | Age: 70
Setting detail: THERAPIES SERIES
Discharge: HOME | End: 2025-08-29
Attending: STUDENT IN AN ORGANIZED HEALTH CARE EDUCATION/TRAINING PROGRAM
Payer: MEDICARE

## 2025-08-29 PROCEDURE — 97161 PT EVAL LOW COMPLEX 20 MIN: CPT

## 2025-08-29 PROCEDURE — 97110 THERAPEUTIC EXERCISES: CPT

## 2025-08-29 ASSESSMENT — HOOS JR
WALKING ON UNEVEN SURFACE: MILD
HOOS JR RAW SCORE: 5
HOOS JR TOTAL INTERVAL SCORE: 73.472
GOING UP OR DOWN STAIRS: MILD
HOOS JR RAW SCORE: 5
BENDING TO THE FLOOR TO PICK UP OBJECT: MILD
LYING IN BED (TURNING OVER, MAINTAINING HIP POSITION): MILD
RISING FROM SITTING: MILD

## (undated) DEVICE — GLOVE ORTHO 8   MSG9480

## (undated) DEVICE — BNDG,ELSTC,MATRIX,STRL,2"X5YD,LF,HOOK&LP: Brand: MEDLINE

## (undated) DEVICE — SUTURE ETHLN SZ 3-0 L18IN NONABSORBABLE BLK FS-1 L24MM 3/8 663H

## (undated) DEVICE — GOWN,SIRUS,NONRNF,SETINSLV,XL,20/CS: Brand: MEDLINE

## (undated) DEVICE — PADDING CAST W2INXL4YD COT LO LINTING WYTEX

## (undated) DEVICE — MERCY HEALTH ST CHARLES: Brand: MEDLINE INDUSTRIES, INC.

## (undated) DEVICE — ZIMMER® STERILE DISPOSABLE TOURNIQUET CUFF WITH PLC, DUAL PORT, SINGLE BLADDER, 18 IN. (46 CM)

## (undated) DEVICE — DRESSING,GAUZE,XEROFORM,CURAD,1"X8",ST: Brand: CURAD

## (undated) DEVICE — Device

## (undated) DEVICE — BANDAGE,ELASTIC,ESMARK,STERILE,4"X9',LF: Brand: MEDLINE

## (undated) DEVICE — TOWEL,OR,DSP,ST,BLUE,STD,6/PK,12PK/CS: Brand: MEDLINE